# Patient Record
Sex: MALE | Race: WHITE | NOT HISPANIC OR LATINO | Employment: FULL TIME | URBAN - METROPOLITAN AREA
[De-identification: names, ages, dates, MRNs, and addresses within clinical notes are randomized per-mention and may not be internally consistent; named-entity substitution may affect disease eponyms.]

---

## 2020-09-18 LAB
ABS NEUT (OLG): 3.16 X10(3)/MCL (ref 2.1–9.2)
ALBUMIN SERPL-MCNC: 3.9 GM/DL (ref 3.5–5)
ALBUMIN/GLOB SERPL: 1.5 RATIO (ref 1.1–2)
ALP SERPL-CCNC: 52 UNIT/L (ref 40–150)
ALT SERPL-CCNC: 60 UNIT/L (ref 0–55)
APTT PPP: 28.5 SECOND(S) (ref 23.2–33.7)
AST SERPL-CCNC: 28 UNIT/L (ref 5–34)
BASOPHILS # BLD AUTO: 0 X10(3)/MCL (ref 0–0.2)
BASOPHILS NFR BLD AUTO: 1 %
BILIRUB SERPL-MCNC: 0.7 MG/DL
BILIRUBIN DIRECT+TOT PNL SERPL-MCNC: 0.2 MG/DL (ref 0–0.5)
BILIRUBIN DIRECT+TOT PNL SERPL-MCNC: 0.5 MG/DL (ref 0–0.8)
BUN SERPL-MCNC: 15.7 MG/DL (ref 8.9–20.6)
CALCIUM SERPL-MCNC: 9 MG/DL (ref 8.4–10.2)
CHLORIDE SERPL-SCNC: 100 MMOL/L (ref 98–107)
CO2 SERPL-SCNC: 28 MMOL/L (ref 22–29)
CREAT SERPL-MCNC: 1.27 MG/DL (ref 0.73–1.18)
EOSINOPHIL # BLD AUTO: 0.4 X10(3)/MCL (ref 0–0.9)
EOSINOPHIL NFR BLD AUTO: 8 %
ERYTHROCYTE [DISTWIDTH] IN BLOOD BY AUTOMATED COUNT: 14.1 % (ref 11.5–17)
GLOBULIN SER-MCNC: 2.6 GM/DL (ref 2.4–3.5)
GLUCOSE SERPL-MCNC: 89 MG/DL (ref 74–100)
HCT VFR BLD AUTO: 47.2 % (ref 42–52)
HGB BLD-MCNC: 15.1 GM/DL (ref 14–18)
INR PPP: 1 (ref 0–1.3)
LYMPHOCYTES # BLD AUTO: 1.8 X10(3)/MCL (ref 0.6–4.6)
LYMPHOCYTES NFR BLD AUTO: 31 %
MCH RBC QN AUTO: 28.6 PG (ref 27–31)
MCHC RBC AUTO-ENTMCNC: 32 GM/DL (ref 33–36)
MCV RBC AUTO: 89.4 FL (ref 80–94)
MONOCYTES # BLD AUTO: 0.4 X10(3)/MCL (ref 0.1–1.3)
MONOCYTES NFR BLD AUTO: 7 %
NEUTROPHILS # BLD AUTO: 3.16 X10(3)/MCL (ref 2.1–9.2)
NEUTROPHILS NFR BLD AUTO: 53 %
PLATELET # BLD AUTO: 275 X10(3)/MCL (ref 130–400)
PMV BLD AUTO: 9.6 FL (ref 9.4–12.4)
POTASSIUM SERPL-SCNC: 4.4 MMOL/L (ref 3.5–5.1)
PROT SERPL-MCNC: 6.5 GM/DL (ref 6.4–8.3)
PROTHROMBIN TIME: 12.6 SECOND(S) (ref 11.1–13.7)
RBC # BLD AUTO: 5.28 X10(6)/MCL (ref 4.7–6.1)
SODIUM SERPL-SCNC: 138 MMOL/L (ref 136–145)
WBC # SPEC AUTO: 5.9 X10(3)/MCL (ref 4.5–11.5)

## 2020-09-23 ENCOUNTER — HISTORICAL (OUTPATIENT)
Dept: SURGERY | Facility: HOSPITAL | Age: 50
End: 2020-09-23

## 2020-09-24 LAB
GRAM STN SPEC: NORMAL
GRAM STN SPEC: NORMAL

## 2020-09-25 LAB
FINAL CULTURE: NO GROWTH
FINAL CULTURE: NO GROWTH

## 2020-09-26 LAB
FINAL CULTURE: NORMAL
FINAL CULTURE: NORMAL

## 2020-10-26 ENCOUNTER — HISTORICAL (OUTPATIENT)
Dept: ADMINISTRATIVE | Facility: HOSPITAL | Age: 50
End: 2020-10-26

## 2020-10-26 LAB
FINAL CULTURE: NORMAL
FINAL CULTURE: NORMAL
PSA SERPL-MCNC: 1.66 NG/ML (ref 0–2.5)

## 2021-07-19 ENCOUNTER — HISTORICAL (OUTPATIENT)
Dept: ADMINISTRATIVE | Facility: HOSPITAL | Age: 51
End: 2021-07-19

## 2021-07-19 LAB
ALBUMIN SERPL-MCNC: 4.4 GM/DL (ref 3.4–5)
ALBUMIN/GLOB SERPL: 2.2 {RATIO} (ref 1.5–2.5)
ALP SERPL-CCNC: 53 UNIT/L (ref 38–126)
ALT SERPL-CCNC: 27 UNIT/L (ref 7–52)
ANTINUCLEAR ANTIBODY SCREEN (OHS): NEGATIVE
AST SERPL-CCNC: 21 UNIT/L (ref 15–37)
BILIRUB SERPL-MCNC: 0.5 MG/DL (ref 0.2–1)
BILIRUBIN DIRECT+TOT PNL SERPL-MCNC: 0.1 MG/DL (ref 0–0.5)
BILIRUBIN DIRECT+TOT PNL SERPL-MCNC: 0.4 MG/DL
BUN SERPL-MCNC: 21 MG/DL (ref 7–18)
CALCIUM SERPL-MCNC: 9.1 MG/DL (ref 8.5–10.1)
CHLORIDE SERPL-SCNC: 100 MMOL/L (ref 98–107)
CO2 SERPL-SCNC: 27 MMOL/L (ref 21–32)
CREAT SERPL-MCNC: 1.45 MG/DL (ref 0.6–1.3)
CRP SERPL-MCNC: 0.17 MG/DL
DSDNA ANTIBODY (OHS): NEGATIVE
ERYTHROCYTE [SEDIMENTATION RATE] IN BLOOD: 5 MM/HR (ref 0–15)
GLOBULIN SER-MCNC: 2 GM/DL (ref 1.2–3)
GLUCOSE SERPL-MCNC: 102 MG/DL (ref 74–106)
POTASSIUM SERPL-SCNC: 4.2 MMOL/L (ref 3.5–5.1)
PROT SERPL-MCNC: 6.4 GM/DL (ref 6.4–8.2)
RHEUMATOID FACT SERPL-ACNC: <13 IU/ML
SODIUM SERPL-SCNC: 136 MMOL/L (ref 136–145)
URATE SERPL-MCNC: 7.1 MG/DL (ref 2.6–7.2)

## 2021-08-16 ENCOUNTER — HISTORICAL (OUTPATIENT)
Dept: ADMINISTRATIVE | Facility: HOSPITAL | Age: 51
End: 2021-08-16

## 2021-08-16 LAB
ALBUMIN SERPL-MCNC: 4.1 GM/DL (ref 3.5–5)
ALBUMIN/GLOB SERPL: 1.4 RATIO (ref 1.1–2)
ALP SERPL-CCNC: 62 UNIT/L (ref 40–150)
ALT SERPL-CCNC: 28 UNIT/L (ref 0–55)
APPEARANCE, UA: CLEAR
AST SERPL-CCNC: 23 UNIT/L (ref 5–34)
BACTERIA SPEC CULT: NORMAL /HPF
BILIRUB SERPL-MCNC: 0.6 MG/DL
BILIRUB UR QL STRIP: NEGATIVE
BILIRUBIN DIRECT+TOT PNL SERPL-MCNC: 0.2 MG/DL (ref 0–0.5)
BILIRUBIN DIRECT+TOT PNL SERPL-MCNC: 0.4 MG/DL (ref 0–0.8)
BUN SERPL-MCNC: 11.5 MG/DL (ref 8.9–20.6)
CALCIUM SERPL-MCNC: 9.5 MG/DL (ref 8.4–10.2)
CHLORIDE SERPL-SCNC: 104 MMOL/L (ref 98–107)
CO2 SERPL-SCNC: 20 MMOL/L (ref 22–29)
COLOR UR: YELLOW
CREAT SERPL-MCNC: 1.28 MG/DL (ref 0.73–1.18)
CRP SERPL HS-MCNC: 0.3 MG/DL
GLOBULIN SER-MCNC: 2.9 GM/DL (ref 2.4–3.5)
GLUCOSE (UA): NEGATIVE
GLUCOSE SERPL-MCNC: 79 MG/DL (ref 74–100)
HGB UR QL STRIP: NEGATIVE
KETONES UR QL STRIP: NEGATIVE
LEUKOCYTE ESTERASE UR QL STRIP: NEGATIVE
NITRITE UR QL STRIP: NEGATIVE
PH UR STRIP: 6 [PH] (ref 5–9)
POTASSIUM SERPL-SCNC: 4.9 MMOL/L (ref 3.5–5.1)
PROT SERPL-MCNC: 7 GM/DL (ref 6.4–8.3)
PROT UR QL STRIP: NEGATIVE
RBC #/AREA URNS HPF: NORMAL /[HPF]
SODIUM SERPL-SCNC: 136 MMOL/L (ref 136–145)
SP GR UR STRIP: 1.01 (ref 1–1.03)
SQUAMOUS EPITHELIAL, UA: NORMAL /HPF (ref 0–4)
UROBILINOGEN UR STRIP-ACNC: 0.2
WBC #/AREA URNS HPF: NORMAL /HPF

## 2021-08-17 ENCOUNTER — HISTORICAL (OUTPATIENT)
Dept: ADMINISTRATIVE | Facility: HOSPITAL | Age: 51
End: 2021-08-17

## 2021-08-17 LAB
ABS NEUT (OLG): 2.3 X10(3)/MCL (ref 2.1–9.2)
BASOPHILS # BLD AUTO: 0 X10(3)/MCL (ref 0–0.2)
BASOPHILS NFR BLD AUTO: 1 %
EOSINOPHIL # BLD AUTO: 0.3 X10(3)/MCL (ref 0–0.9)
EOSINOPHIL NFR BLD AUTO: 6 %
ERYTHROCYTE [DISTWIDTH] IN BLOOD BY AUTOMATED COUNT: 13.7 % (ref 11.5–17)
HCT VFR BLD AUTO: 44.2 % (ref 42–52)
HGB BLD-MCNC: 14.3 GM/DL (ref 14–18)
LYMPHOCYTES # BLD AUTO: 1.7 X10(3)/MCL (ref 0.6–4.6)
LYMPHOCYTES NFR BLD AUTO: 36 %
MCH RBC QN AUTO: 28.5 PG (ref 27–31)
MCHC RBC AUTO-ENTMCNC: 32.4 GM/DL (ref 33–36)
MCV RBC AUTO: 88.2 FL (ref 80–94)
MONOCYTES # BLD AUTO: 0.3 X10(3)/MCL (ref 0.1–1.3)
MONOCYTES NFR BLD AUTO: 7 %
NEUTROPHILS # BLD AUTO: 2.3 X10(3)/MCL (ref 2.1–9.2)
NEUTROPHILS NFR BLD AUTO: 50 %
PLATELET # BLD AUTO: 252 X10(3)/MCL (ref 130–400)
PMV BLD AUTO: 10 FL (ref 9.4–12.4)
RBC # BLD AUTO: 5.01 X10(6)/MCL (ref 4.7–6.1)
WBC # SPEC AUTO: 4.6 X10(3)/MCL (ref 4.5–11.5)

## 2022-04-11 ENCOUNTER — HISTORICAL (OUTPATIENT)
Dept: ADMINISTRATIVE | Facility: HOSPITAL | Age: 52
End: 2022-04-11
Payer: COMMERCIAL

## 2022-04-29 VITALS
OXYGEN SATURATION: 97 % | DIASTOLIC BLOOD PRESSURE: 84 MMHG | SYSTOLIC BLOOD PRESSURE: 122 MMHG | BODY MASS INDEX: 33.99 KG/M2 | HEIGHT: 69 IN | WEIGHT: 229.5 LBS

## 2022-04-30 NOTE — OP NOTE
DATE OF SURGERY:        SURGEON:  Allen Chaudhary MD  ASSISTANT:  Palmira Osman RN    PREOPERATIVE DIAGNOSIS:  Nasal obstruction, chronic sinusitis.    POSTOPERATIVE DIAGNOSIS:  Nasal obstruction, chronic sinusitis.    PROCEDURES:  Septoplasty, bilateral complete ethmoidectomies, exploration of the nasofrontal ducts and sphenoidotomies, inferior turbinate reduction.    DESCRIPTION OF PROCEDURE:  The patient was brought to the operating room, placed in the supine position.  After achieving general endotracheal anesthesia, eyes were protected with Lacri-Lube while throat pack was placed in the oropharynx.  Then, 4% Betadine paint was used to irrigate the nose, and after sufficient time had elapsed, the nose was decongested with 4% cocaine, topical adrenaline, as well as injected with 1% lidocaine with 1:100,000 epinephrine into the soft tissues of the nose.  Navigation was instituted, and the patient prepped and draped for surgery.  We used a 15 blade.  A transfixion incision was made with the use of Iris scissors, 15 blade, and [place] elevator.  Bilateral mucoperichondrial flaps were elevated.  Deviated portions of quadrilateral cartilage were removed as well as bone using a floor chisel.  Care was taken to leave appropriate dorsal and caudal struts.  Raymundo-Diego forceps were used then to remove the posterior deviation.  Once the deviation was corrected, 4-0 plain sutures on a Dwight needle were used to re-coapt the mucoperichondrial flaps.  4-0 chromic sutures were used to close the transfixion incision.  Then, with the 0-degree endoscope, the middle turbinate on the left was infractured, exposing the bulla ethmoidalis, which was removed with a ball-tip seeker as well as backbiting forceps and shaver.  The bulla ethmoidalis was entered as well as the frontal recess, removing large amounts of polyps from both regions.  Maxillary sphenoidotomy was completed and noted the sphenoid sinus to be clear.   The maxillary sinus was then identified and ostia enlarged with posterior Hang-Cut forceps and anterior backbiting forceps.  Cultures were taken.  Thick mucoid discharge was suctioned from the maxillary sinus.  A Propel nasal stent was placed in the middle turbinate and lateral nasal wall.  The inferior nasal turbinate was then infractured with Gregg elevator, hydrodissected with 1% lidocaine with 1:100,000 epinephrine.  Then the sickle knife was used to make a stab incision on the anterior edge.  [place] elevator was used to tease the mucosa off the turbinate bone, and then a shaver was used to perform submucous resection.  The turbinates were then crushed and outfractured.  Procedure was done bilaterally.  A Rapid Rhino was placed along the inferior nasal turbinate.  A small amount of Bactroban ointment was used to pack the nose anteriorly.  Throat packs removed.  Pharynx was carefully suctioned.  The eyes were washed with BSS.  Cold     compress applied.  The patient tolerated the procedure well, was awakened and extubated in the operating room, brought to recovery in stable condition.        ______________________________  MD CARLEEN Mendenhall/  DD:  09/30/2020  Time:  10:15AM  DT:  09/30/2020  Time:  10:34AM  Job #:  [id number]

## 2022-05-05 NOTE — HISTORICAL [facility] CERNER
This is a historical note converted from Kelly. Formatting and pictures may have been removed.  Please reference Kelly for original formatting and attached multimedia. Chief Complaint  Pansinusitis  Reason for Consultation  Hypertension  History of Present Illness  Pt is a 49 year old WM pt of Dr. Hansel Sarabia (pt hasnt seen him in a while)?who was admitted to Dr. Kenan Chaudhary with ENT here at [facility] for an elective FESS for pansinusitis and DNS.? No h/o HTN.? Pts BP was 128/83 preoperatively on arrival but postoperatively he has been running 170s-190s over 100s to 120s.? Received some labetalol, clonidine, metoprolol without much effect.? Pt resting comfortably.? He is anxious but denies dizziness, CP, or SOB.  Review of Systems  Except as documented all systems were reviewed and are negative.  ?   Medications (15) Active  Scheduled: (2)  cloniDINE 0.3 mg Tab UD ?0.3 mg 1 tab(s), Oral, Once  labetalol 200 mg Tab UD ?200 mg 1 tab(s), Oral, BID  Continuous: (1)  lactated ringers 1,000 mL ?1,000 mL, IV, 75 mL/hr  PRN: (12)  enalapril 1.25 mg/mL Soln - 2mL ?2.5 mg 2 mL, IV Push, q2hr  hydrALAzine 20 mg/ml Inj- 1 mL ?20 mg 1 mL, IV Push, q2hr  hydrocodone 7.5mg/APAP 325mg ?1 tab(s), Oral, q4hr  labetalol 5 mg/mL 20mL vial ?20 mg 4 mL, IV Push, q2hr  LORazepam 2 mg/ml Inj ?2 mg 1 mL, IV Push, q4hr  meperidine 25 mg/ml Inj - 1 mL ?75 mg 3 mL, IM, q4hr  meperidine 50 mg/ml Inj ?50 mg 1 mL, IM, q4hr  meperidine 50 mg/ml Inj ?50 mg 1 mL, IM, q4hr  morphine 4 mg/mL preservative-free Soln ?2 mg 0.5 mL, IV Push, q10min  ondansetron 2 mg/mL inj - 2mL ?4 mg 2 mL, IV, q4hr  ondansetron 2 mg/mL inj - 2mL ?4 mg 2 mL, IM, q4hr  oxymetazoline nasal 0.05% Spray - 30mL ?2 spray(s), Both Nostrils, q4hr  Physical Exam  Vitals & Measurements  T:?36.7? ?C (Temporal Artery)? TMIN:?36.6? ?C (Temporal Artery)? TMAX:?36.9? ?C (Oral)? HR:?91(Peripheral)? RR:?20? BP:?191/122? SpO2:?97%? WT:?107.7?kg? BMI:?35.06?  General:?obese WM in no  acute distress  Eye: PERRLA, EOMI, clear conjunctiva, eyelids normal  HEENT:?facial and neck erythema; nasal packing in place  Neck: full range of motion, no thyromegaly or lymphadenopathy  Respiratory:?clear to auscultation bilaterally  Cardiovascular:?regular rate and rhythm  Gastrointestinal:?soft, non-tender, non-distended with normal bowel sounds  Musculoskeletal:?no gross deformity  Integumentary: no rashes or skin lesions present  Neurologic: cranial nerves grossly intact, no signs of peripheral neurological deficit  Psychiatric: flat affect; anxious  Assessment/Plan  1.?Hypertensive urgency?I16.0  2.?Chronic pansinusitis?J32.4  3.?Obesity?E66.9  4.?Anxiety?F41.9  5.?Depression?F32.9  6.?GERD - Gastro-esophageal reflux disease?K21.9  7.?Sleep apnea?G47.30  8.?Elevated serum creatinine?R79.89  ?makes me suspect underlying undiagnosed hypertension  ?  Plan:  Start with clonidine 0.3 mg and labetalol 200 mg PO now  Alternate IV labetalol, hydralazine, enalapril PRN BP > 160/90  If unable to control BP with above then will have to transfer to main [place] for a continuous antihypertensive infusion   Problem List/Past Medical History  Ongoing  Anxiety  Chronic pansinusitis  Depression  GERD - Gastro-esophageal reflux disease  Obesity  Sleep apnea  Smoker  Historical  Obesity  Procedure/Surgical History  FESS Septo/Turb SMR with Mario (Bilateral) (2020)  Colonoscopy, flexible; with endoscopic ultrasound examination limited to the rectum, sigmoid, descending, transverse, or ascending colon and cecum, and adjacent structures ()  Repair, paraesophageal hiatal hernia, (including fundoplication), via thoracoabdominal incision, except ; without implantation of mesh or other prosthesis ()  Tonsillectomy, primary or secondary; age 12 or over ()  Hyoid myotomy and suspension   Medications  Inpatient  Afrin Nasal Sinus, 2 spray(s), Both Nostrils, q4hr, PRN  Ativan, 2 mg= 1 mL, IV Push, q4hr,  PRN  Demerol HCl, 50 mg= 1 mL, IM, q4hr, PRN  Demerol HCl, 75 mg= 3 mL, IM, q4hr, PRN  Demerol HCl 50 mg/mL injectable solution, 50 mg= 1 mL, IM, q4hr, PRN  enalapril 1.25 mg/mL intravenous solution, 2.5 mg= 2 mL, IV Push, q2hr, PRN  hydrALAZINE (Apresoline) Inj., 20 mg= 1 mL, IV Push, q2hr, PRN  labetalol 200 mg oral tablet, 200 mg= 1 tab(s), Oral, BID  labetalol 5 mg/mL intravenous solution, 20 mg= 4 mL, IV Push, q2hr, PRN  [id number] 1,000 mL, 1000 mL, IV  morphine, 2 mg= 0.5 mL, IV Push, q10min, PRN  Norco 7.5 mg-325 mg oral tablet, 1 tab(s), Oral, q4hr, PRN  Zofran, 4 mg= 2 mL, IV, q4hr, PRN  Zofran, 4 mg= 2 mL, IM, q4hr, PRN  Home  acetaminophen-hydrocodone 325 mg-7.5 mg oral tablet, 1 tab(s), Oral, q6hr  amphetamine-dextroamphetamine 20 mg oral tablet, 20 mg= 1 tab(s), Oral, BID,? ?Not taking  aspirin 81 mg oral Delayed Release (EC) tablet, 81 mg= 1 tab(s), Oral, Daily  cefdinir 300 mg oral capsule, 300 mg= 1 cap(s), Oral, q12hr  clonazePAM 1 mg oral tablet, 1 mg= 1 tab(s), Oral, TID,? ?Not taking  famotidine 20 mg oral tablet, 20 mg= 1 tab(s), Oral, qPM  fluticasone 50 mcg/inh nasal spray, 1 spray(s), Both Nostrils, qPM  methylPREDNISolone 4 mg oral tab, Oral, As Directed  omeprazole 40 mg oral DR capsule, 40 mg= 1 cap(s), Oral, BID  Zanaflex 4 mg oral capsule, 4 mg= 1 cap(s), Oral, TID,? ?Not taking  Allergies  No Known Allergies  No Known Medication Allergies  Social History  Abuse/Neglect  No, No, Yes, 09/23/2020  No, 11/19/2019  Alcohol  Current, Beer, Wine, 1-2 times per month, 09/23/2020  Employment/School  Unemployed, LAYED OFF, Work/School description:  LAYED OFF IN July. Highest education level: High school., 09/23/2020  Exercise  Exercise type: Walking., 09/20/2020  Financial/Legal Situation  LAYED OFF OF WORK IN MARCH, 09/20/2020  Home/Environment  Lives with Significant other. Living situation: Home/Independent. CPAP/BiPAP, 09/20/2020    Never in ,  09/20/2020  Nutrition/Health  Regular, Good, Diet restrictions: NO ALLERGIES TO FOOD., 09/20/2020  Sexual  Sexually active: Yes. Number of current partners 1. Gender Identity Identifies as male., 09/20/2020  Spiritual/Cultural  Voodoo, 09/20/2020  Substance Use  Past, Amphetamines, Cocaine, Ecstasy, Marijuana, 09/20/2020  Tobacco  10 or more cigarettes (1/2 pack or more)/day in last 30 days, No, 09/23/2020  Former smoker, Cigarettes, 07/20/2017  Family History  Primary malignant neoplasm of prostate: Father.  Lab Results  SCr of 1.27 on 9/18/20 and 1.36 on 11/19/19      Critical Care Diagnosis: Hypertensive urgency requiring IV antihypertensives  Critical care interventions: hands on evaluation, review of labs/radiographs/records and discussions with patient.?  Critical care time spent =?35 minutes.

## 2022-08-12 ENCOUNTER — OFFICE VISIT (OUTPATIENT)
Dept: RHEUMATOLOGY | Facility: CLINIC | Age: 52
End: 2022-08-12
Payer: COMMERCIAL

## 2022-08-12 VITALS
DIASTOLIC BLOOD PRESSURE: 86 MMHG | SYSTOLIC BLOOD PRESSURE: 122 MMHG | TEMPERATURE: 98 F | WEIGHT: 234.38 LBS | HEART RATE: 100 BPM | OXYGEN SATURATION: 95 % | HEIGHT: 69 IN | RESPIRATION RATE: 18 BRPM | BODY MASS INDEX: 34.71 KG/M2

## 2022-08-12 DIAGNOSIS — L40.50 PSORIATIC ARTHRITIS: Primary | ICD-10-CM

## 2022-08-12 DIAGNOSIS — N28.9 RENAL INSUFFICIENCY: ICD-10-CM

## 2022-08-12 DIAGNOSIS — L40.9 PSORIASIS: ICD-10-CM

## 2022-08-12 DIAGNOSIS — F90.0 ATTENTION DEFICIT HYPERACTIVITY DISORDER (ADHD), PREDOMINANTLY INATTENTIVE TYPE: ICD-10-CM

## 2022-08-12 DIAGNOSIS — G47.30 SLEEP APNEA, UNSPECIFIED TYPE: ICD-10-CM

## 2022-08-12 PROCEDURE — 3008F BODY MASS INDEX DOCD: CPT | Mod: CPTII,S$GLB,, | Performed by: INTERNAL MEDICINE

## 2022-08-12 PROCEDURE — 1160F PR REVIEW ALL MEDS BY PRESCRIBER/CLIN PHARMACIST DOCUMENTED: ICD-10-PCS | Mod: CPTII,S$GLB,, | Performed by: INTERNAL MEDICINE

## 2022-08-12 PROCEDURE — 99213 OFFICE O/P EST LOW 20 MIN: CPT | Mod: S$GLB,,, | Performed by: INTERNAL MEDICINE

## 2022-08-12 PROCEDURE — 3008F PR BODY MASS INDEX (BMI) DOCUMENTED: ICD-10-PCS | Mod: CPTII,S$GLB,, | Performed by: INTERNAL MEDICINE

## 2022-08-12 PROCEDURE — 99999 PR PBB SHADOW E&M-EST. PATIENT-LVL III: ICD-10-PCS | Mod: PBBFAC,,, | Performed by: INTERNAL MEDICINE

## 2022-08-12 PROCEDURE — 99999 PR PBB SHADOW E&M-EST. PATIENT-LVL III: CPT | Mod: PBBFAC,,, | Performed by: INTERNAL MEDICINE

## 2022-08-12 PROCEDURE — 1159F PR MEDICATION LIST DOCUMENTED IN MEDICAL RECORD: ICD-10-PCS | Mod: CPTII,S$GLB,, | Performed by: INTERNAL MEDICINE

## 2022-08-12 PROCEDURE — 1159F MED LIST DOCD IN RCRD: CPT | Mod: CPTII,S$GLB,, | Performed by: INTERNAL MEDICINE

## 2022-08-12 PROCEDURE — 1160F RVW MEDS BY RX/DR IN RCRD: CPT | Mod: CPTII,S$GLB,, | Performed by: INTERNAL MEDICINE

## 2022-08-12 PROCEDURE — 99213 PR OFFICE/OUTPT VISIT, EST, LEVL III, 20-29 MIN: ICD-10-PCS | Mod: S$GLB,,, | Performed by: INTERNAL MEDICINE

## 2022-08-12 RX ORDER — ROSUVASTATIN CALCIUM 10 MG/1
10 TABLET, COATED ORAL NIGHTLY
COMMUNITY
Start: 2022-08-11

## 2022-08-12 RX ORDER — AMOXICILLIN AND CLAVULANATE POTASSIUM 875; 125 MG/1; MG/1
1 TABLET, FILM COATED ORAL 2 TIMES DAILY
COMMUNITY
Start: 2022-08-05 | End: 2023-01-23

## 2022-08-12 RX ORDER — CYCLOBENZAPRINE HCL 10 MG
10 TABLET ORAL NIGHTLY
Qty: 90 TABLET | Refills: 3 | Status: SHIPPED | OUTPATIENT
Start: 2022-08-12 | End: 2022-12-09 | Stop reason: SDUPTHER

## 2022-08-12 RX ORDER — ASPIRIN 81 MG/1
81 TABLET ORAL DAILY
COMMUNITY

## 2022-08-12 RX ORDER — LEFLUNOMIDE 20 MG/1
20 TABLET ORAL DAILY
Qty: 90 TABLET | Refills: 3 | Status: SHIPPED | OUTPATIENT
Start: 2022-08-12 | End: 2022-12-09 | Stop reason: SDUPTHER

## 2022-08-12 RX ORDER — LEFLUNOMIDE 20 MG/1
20 TABLET ORAL DAILY
COMMUNITY
Start: 2022-08-11 | End: 2022-08-12 | Stop reason: SDUPTHER

## 2022-08-12 RX ORDER — OMEPRAZOLE 40 MG/1
40 CAPSULE, DELAYED RELEASE ORAL DAILY
Qty: 90 CAPSULE | Refills: 3 | Status: SHIPPED | OUTPATIENT
Start: 2022-08-12 | End: 2022-12-09 | Stop reason: SDUPTHER

## 2022-08-12 RX ORDER — CYCLOBENZAPRINE HCL 10 MG
10 TABLET ORAL NIGHTLY
COMMUNITY
Start: 2022-08-11 | End: 2022-08-12 | Stop reason: SDUPTHER

## 2022-08-12 RX ORDER — FLUTICASONE PROPIONATE 50 MCG
1 SPRAY, SUSPENSION (ML) NASAL DAILY
COMMUNITY
Start: 2022-08-11

## 2022-08-12 RX ORDER — OMEPRAZOLE 40 MG/1
40 CAPSULE, DELAYED RELEASE ORAL DAILY
COMMUNITY
Start: 2022-02-11 | End: 2022-08-12 | Stop reason: SDUPTHER

## 2022-08-12 NOTE — PROGRESS NOTES
"Subjective:       Patient ID: Scott Plaza is a 51 y.o. male.    Chief Complaint: 4 month f/u    The patient is complaining of joint pain involving the MCP PIP wrist elbow shoulders hips knees and ankles bilaterally.  The pain is 2/10 in intensity dull in quality and continuous.  That is associated with a morning stiffness lasting for more than 60 minutes.  Is also having difficulty maintaining a good night of sleep.  This has been associated with myalgias.  Muscle aches are 2/10 in intensity dull in quality and continuous.  They are associated with fatigue.  No fever no chills no others.      Review of Systems   Constitutional: Negative for appetite change, chills and fever.   HENT: Negative for congestion, ear pain, mouth sores, nosebleeds and trouble swallowing.    Eyes: Negative for photophobia and discharge.   Respiratory: Negative for chest tightness and shortness of breath.    Cardiovascular: Negative for chest pain.   Gastrointestinal: Negative for abdominal pain and vomiting.   Endocrine: Negative.    Genitourinary: Negative for hematuria.   Musculoskeletal:        As per HPI   Skin: Negative for rash.   Neurological: Negative for weakness.         Objective:   /86 (BP Location: Left arm, Patient Position: Sitting, BP Method: Medium (Automatic))   Pulse 100   Temp 98.1 °F (36.7 °C) (Oral)   Resp 18   Ht 5' 9" (1.753 m)   Wt 106.3 kg (234 lb 5.8 oz)   SpO2 95%   BMI 34.61 kg/m²      Physical Exam   Constitutional: He is oriented to person, place, and time. He appears well-developed and well-nourished. No distress.   HENT:   Head: Normocephalic and atraumatic.   Right Ear: External ear normal.   Left Ear: External ear normal.   Eyes: Pupils are equal, round, and reactive to light.   Cardiovascular: Normal rate, regular rhythm and normal heart sounds.   Pulmonary/Chest: Breath sounds normal.   Abdominal: Soft. There is no abdominal tenderness.   Musculoskeletal:      Right shoulder: Normal. "      Left shoulder: Normal.      Right elbow: Normal.      Left elbow: Normal.      Right wrist: Normal.      Left wrist: Normal.      Cervical back: Neck supple.      Right hip: Normal.      Left hip: Normal.      Right knee: Normal.      Left knee: Normal.   Lymphadenopathy:     He has no cervical adenopathy.   Neurological: He is alert and oriented to person, place, and time. He displays normal reflexes. No cranial nerve deficit or sensory deficit. He exhibits normal muscle tone. Coordination normal.   Skin: No rash noted. No erythema.   Vitals reviewed.      Right Side Rheumatological Exam     Examination finds the shoulder, elbow, wrist, knee, 1st PIP, 1st MCP, 2nd PIP, 2nd MCP, 3rd PIP, 3rd MCP, 4th PIP, 4th MCP, 5th PIP, 5th MCP, temporomandibular, hip, ankle, 1st MTP, 2nd MTP, 3rd MTP, 4th MTP and 5th MTP normal.    Left Side Rheumatological Exam     Examination finds the shoulder, elbow, wrist, knee, 1st PIP, 1st MCP, 2nd PIP, 2nd MCP, 3rd PIP, 3rd MCP, 4th PIP, 4th MCP, 5th PIP, 5th MCP, temporomandibular, hip, ankle, 1st MTP, 2nd MTP, 3rd MTP, 4th MTP and 5th MTP normal.         Completed Fibromyalgia exam 2/18 tender points.  No data to display     Assessment:       1. Psoriatic arthritis    2. Psoriasis    3. Renal insufficiency    4. Sleep apnea, unspecified type    5. Attention deficit hyperactivity disorder (ADHD), predominantly inattentive type            Plan:       Problem List Items Addressed This Visit        Psychiatric    Attention deficit hyperactivity disorder (ADHD), predominantly inattentive type    Relevant Medications    cyclobenzaprine (FLEXERIL) 10 MG tablet    leflunomide (ARAVA) 20 MG Tab    omeprazole (PRILOSEC) 40 MG capsule       Derm    Psoriasis    Relevant Medications    cyclobenzaprine (FLEXERIL) 10 MG tablet    leflunomide (ARAVA) 20 MG Tab    omeprazole (PRILOSEC) 40 MG capsule       Renal/    Renal insufficiency    Relevant Medications    cyclobenzaprine (FLEXERIL) 10 MG  tablet    leflunomide (ARAVA) 20 MG Tab    omeprazole (PRILOSEC) 40 MG capsule       Orthopedic    Psoriatic arthritis - Primary    Relevant Medications    cyclobenzaprine (FLEXERIL) 10 MG tablet    leflunomide (ARAVA) 20 MG Tab    omeprazole (PRILOSEC) 40 MG capsule       Other    Sleep apnea    Relevant Medications    cyclobenzaprine (FLEXERIL) 10 MG tablet    leflunomide (ARAVA) 20 MG Tab    omeprazole (PRILOSEC) 40 MG capsule

## 2022-12-09 ENCOUNTER — OFFICE VISIT (OUTPATIENT)
Dept: RHEUMATOLOGY | Facility: CLINIC | Age: 52
End: 2022-12-09
Payer: COMMERCIAL

## 2022-12-09 VITALS
BODY MASS INDEX: 34.93 KG/M2 | RESPIRATION RATE: 18 BRPM | WEIGHT: 235.81 LBS | SYSTOLIC BLOOD PRESSURE: 133 MMHG | HEART RATE: 96 BPM | TEMPERATURE: 98 F | HEIGHT: 69 IN | OXYGEN SATURATION: 95 % | DIASTOLIC BLOOD PRESSURE: 83 MMHG

## 2022-12-09 DIAGNOSIS — F90.0 ATTENTION DEFICIT HYPERACTIVITY DISORDER (ADHD), PREDOMINANTLY INATTENTIVE TYPE: ICD-10-CM

## 2022-12-09 DIAGNOSIS — G47.30 SLEEP APNEA, UNSPECIFIED TYPE: ICD-10-CM

## 2022-12-09 DIAGNOSIS — N28.9 RENAL INSUFFICIENCY: ICD-10-CM

## 2022-12-09 DIAGNOSIS — L40.9 PSORIASIS: ICD-10-CM

## 2022-12-09 DIAGNOSIS — L40.50 PSORIATIC ARTHRITIS: Primary | ICD-10-CM

## 2022-12-09 PROCEDURE — 1159F PR MEDICATION LIST DOCUMENTED IN MEDICAL RECORD: ICD-10-PCS | Mod: CPTII,S$GLB,, | Performed by: INTERNAL MEDICINE

## 2022-12-09 PROCEDURE — 3079F DIAST BP 80-89 MM HG: CPT | Mod: CPTII,S$GLB,, | Performed by: INTERNAL MEDICINE

## 2022-12-09 PROCEDURE — 3008F PR BODY MASS INDEX (BMI) DOCUMENTED: ICD-10-PCS | Mod: CPTII,S$GLB,, | Performed by: INTERNAL MEDICINE

## 2022-12-09 PROCEDURE — 3075F PR MOST RECENT SYSTOLIC BLOOD PRESS GE 130-139MM HG: ICD-10-PCS | Mod: CPTII,S$GLB,, | Performed by: INTERNAL MEDICINE

## 2022-12-09 PROCEDURE — 3079F PR MOST RECENT DIASTOLIC BLOOD PRESSURE 80-89 MM HG: ICD-10-PCS | Mod: CPTII,S$GLB,, | Performed by: INTERNAL MEDICINE

## 2022-12-09 PROCEDURE — 99999 PR PBB SHADOW E&M-EST. PATIENT-LVL III: ICD-10-PCS | Mod: PBBFAC,,, | Performed by: INTERNAL MEDICINE

## 2022-12-09 PROCEDURE — 1159F MED LIST DOCD IN RCRD: CPT | Mod: CPTII,S$GLB,, | Performed by: INTERNAL MEDICINE

## 2022-12-09 PROCEDURE — 3008F BODY MASS INDEX DOCD: CPT | Mod: CPTII,S$GLB,, | Performed by: INTERNAL MEDICINE

## 2022-12-09 PROCEDURE — 99999 PR PBB SHADOW E&M-EST. PATIENT-LVL III: CPT | Mod: PBBFAC,,, | Performed by: INTERNAL MEDICINE

## 2022-12-09 PROCEDURE — 3075F SYST BP GE 130 - 139MM HG: CPT | Mod: CPTII,S$GLB,, | Performed by: INTERNAL MEDICINE

## 2022-12-09 PROCEDURE — 99214 PR OFFICE/OUTPT VISIT, EST, LEVL IV, 30-39 MIN: ICD-10-PCS | Mod: S$GLB,,, | Performed by: INTERNAL MEDICINE

## 2022-12-09 PROCEDURE — 99214 OFFICE O/P EST MOD 30 MIN: CPT | Mod: S$GLB,,, | Performed by: INTERNAL MEDICINE

## 2022-12-09 RX ORDER — LEFLUNOMIDE 20 MG/1
20 TABLET ORAL DAILY
Qty: 30 TABLET | Refills: 5 | Status: SHIPPED | OUTPATIENT
Start: 2022-12-09 | End: 2023-04-14 | Stop reason: SDUPTHER

## 2022-12-09 RX ORDER — OMEPRAZOLE 40 MG/1
40 CAPSULE, DELAYED RELEASE ORAL EVERY MORNING
Qty: 30 CAPSULE | Refills: 5 | Status: SHIPPED | OUTPATIENT
Start: 2022-12-09 | End: 2023-05-11 | Stop reason: SDUPTHER

## 2022-12-09 RX ORDER — CLOBETASOL PROPIONATE 0.5 MG/G
CREAM TOPICAL NIGHTLY
Qty: 60 G | Refills: 5 | Status: SHIPPED | OUTPATIENT
Start: 2022-12-09 | End: 2023-09-27 | Stop reason: SDUPTHER

## 2022-12-09 RX ORDER — CYCLOBENZAPRINE HCL 10 MG
20 TABLET ORAL NIGHTLY
Qty: 60 TABLET | Refills: 5 | Status: SHIPPED | OUTPATIENT
Start: 2022-12-09 | End: 2023-01-23

## 2022-12-09 NOTE — PROGRESS NOTES
"Subjective:       Patient ID: Scott Plaza is a 52 y.o. male.    Chief Complaint: Follow-up    The patient is complaining of joint pain involving the MCP PIP wrist elbow shoulders hips knees and ankles bilaterally.  The pain is 2/10 in intensity dull in quality and continuous.  That is associated with a morning stiffness lasting for more than 60 minutes.  Is also having difficulty maintaining a good night of sleep.  This has been associated with myalgias.  Muscle aches are 4/10 in intensity dull in quality and continuous.  They are associated with fatigue.  No fever no chills no others.  Psoriasis 20% bsa    Review of Systems   Constitutional:  Negative for appetite change, chills and fever.   HENT:  Negative for congestion, ear pain, mouth sores, nosebleeds and trouble swallowing.    Eyes:  Negative for photophobia and discharge.   Respiratory:  Negative for chest tightness and shortness of breath.    Cardiovascular:  Negative for chest pain.   Gastrointestinal:  Negative for abdominal pain and vomiting.   Endocrine: Negative.    Genitourinary:  Negative for hematuria.   Musculoskeletal:         As per HPI   Skin:         Psoriasis 20% bsa   Neurological:  Negative for weakness.       Objective:   /83 (BP Location: Left arm, Patient Position: Sitting, BP Method: Large (Automatic))   Pulse 96   Temp 98 °F (36.7 °C) (Oral)   Resp 18   Ht 5' 9" (1.753 m)   Wt 107 kg (235 lb 12.8 oz)   SpO2 95%   BMI 34.82 kg/m²      Physical Exam   Constitutional: He is oriented to person, place, and time. He appears well-developed and well-nourished. No distress.   HENT:   Head: Normocephalic and atraumatic.   Right Ear: External ear normal.   Left Ear: External ear normal.   Eyes: Pupils are equal, round, and reactive to light.   Cardiovascular: Normal rate, regular rhythm and normal heart sounds.   Pulmonary/Chest: Breath sounds normal.   Abdominal: Soft. There is no abdominal tenderness.   Musculoskeletal:      " Right shoulder: Normal.      Left shoulder: Normal.      Right elbow: Normal.      Left elbow: Normal.      Right wrist: Normal.      Left wrist: Normal.      Cervical back: Neck supple.      Right hip: Normal.      Left hip: Normal.      Right knee: Normal.      Left knee: Normal.   Lymphadenopathy:     He has no cervical adenopathy.   Neurological: He is alert and oriented to person, place, and time. He displays normal reflexes. No cranial nerve deficit or sensory deficit. He exhibits normal muscle tone. Coordination normal.   Skin:   Psoriasis 20% bsa   Vitals reviewed.      Right Side Rheumatological Exam     Examination finds the shoulder, elbow, wrist, knee, 1st PIP, 1st MCP, 2nd PIP, 2nd MCP, 3rd PIP, 3rd MCP, 4th PIP, 4th MCP, 5th PIP, 5th MCP, temporomandibular, hip, ankle, 1st MTP, 2nd MTP, 3rd MTP, 4th MTP and 5th MTP normal.    Left Side Rheumatological Exam     Examination finds the shoulder, elbow, wrist, knee, 1st PIP, 1st MCP, 2nd PIP, 2nd MCP, 3rd PIP, 3rd MCP, 4th PIP, 4th MCP, 5th PIP, 5th MCP, temporomandibular, hip, ankle, 1st MTP, 2nd MTP, 3rd MTP, 4th MTP and 5th MTP normal.       Completed Fibromyalgia exam 18/18 tender points.  No data to display     Assessment:       1. Psoriatic arthritis    2. Renal insufficiency    3. Psoriasis    4. Attention deficit hyperactivity disorder (ADHD), predominantly inattentive type    5. Sleep apnea, unspecified type            Medication List with Changes/Refills   New Medications    CLOBETASOL (TEMOVATE) 0.05 % CREAM    Apply topically every evening.       Start Date: 12/9/2022 End Date: --   Current Medications    AMOXICILLIN-CLAVULANATE 875-125MG (AUGMENTIN) 875-125 MG PER TABLET    Take 1 tablet by mouth 2 (two) times daily.       Start Date: 8/5/2022  End Date: --    ASPIRIN (ECOTRIN) 81 MG EC TABLET    Take 81 mg by mouth once daily.       Start Date: --        End Date: --    FLUTICASONE PROPIONATE (FLONASE) 50 MCG/ACTUATION NASAL SPRAY    1 spray  by Each Nostril route once daily at 6am.       Start Date: 8/11/2022 End Date: --    ROSUVASTATIN (CRESTOR) 10 MG TABLET    Take 10 mg by mouth nightly.       Start Date: 8/11/2022 End Date: --   Changed and/or Refilled Medications    Modified Medication Previous Medication    CYCLOBENZAPRINE (FLEXERIL) 10 MG TABLET cyclobenzaprine (FLEXERIL) 10 MG tablet       Take 2 tablets (20 mg total) by mouth nightly.    Take 1 tablet (10 mg total) by mouth nightly.       Start Date: 12/9/2022 End Date: --    Start Date: 8/12/2022 End Date: 12/9/2022    LEFLUNOMIDE (ARAVA) 20 MG TAB leflunomide (ARAVA) 20 MG Tab       Take 1 tablet (20 mg total) by mouth once daily.    Take 1 tablet (20 mg total) by mouth once daily.       Start Date: 12/9/2022 End Date: --    Start Date: 8/12/2022 End Date: 12/9/2022    OMEPRAZOLE (PRILOSEC) 40 MG CAPSULE omeprazole (PRILOSEC) 40 MG capsule       Take 1 capsule (40 mg total) by mouth every morning.    Take 1 capsule (40 mg total) by mouth once daily at 6am.       Start Date: 12/9/2022 End Date: --    Start Date: 8/12/2022 End Date: 12/9/2022         Plan:         Problem List Items Addressed This Visit          Psychiatric    Attention deficit hyperactivity disorder (ADHD), predominantly inattentive type    Relevant Medications    cyclobenzaprine (FLEXERIL) 10 MG tablet    leflunomide (ARAVA) 20 MG Tab    omeprazole (PRILOSEC) 40 MG capsule       Derm    Psoriasis    Relevant Medications    cyclobenzaprine (FLEXERIL) 10 MG tablet    leflunomide (ARAVA) 20 MG Tab    omeprazole (PRILOSEC) 40 MG capsule       Renal/    Renal insufficiency    Relevant Medications    cyclobenzaprine (FLEXERIL) 10 MG tablet    leflunomide (ARAVA) 20 MG Tab    omeprazole (PRILOSEC) 40 MG capsule       Orthopedic    Psoriatic arthritis - Primary    Relevant Medications    cyclobenzaprine (FLEXERIL) 10 MG tablet    leflunomide (ARAVA) 20 MG Tab    omeprazole (PRILOSEC) 40 MG capsule       Other    Sleep apnea     Relevant Medications    cyclobenzaprine (FLEXERIL) 10 MG tablet    leflunomide (ARAVA) 20 MG Tab    omeprazole (PRILOSEC) 40 MG capsule

## 2023-01-22 PROBLEM — Z00.00 ENCOUNTER FOR WELLNESS EXAMINATION IN ADULT: Status: ACTIVE | Noted: 2023-01-22

## 2023-01-22 PROBLEM — E66.09 OBESITY DUE TO EXCESS CALORIES WITHOUT SERIOUS COMORBIDITY: Status: ACTIVE | Noted: 2023-01-22

## 2023-01-23 PROBLEM — Z12.5 SCREENING PSA (PROSTATE SPECIFIC ANTIGEN): Status: ACTIVE | Noted: 2023-01-23

## 2023-01-23 PROBLEM — Z23 IMMUNIZATION DUE: Status: ACTIVE | Noted: 2023-01-23

## 2023-01-23 PROBLEM — Z12.11 COLON CANCER SCREENING: Status: ACTIVE | Noted: 2023-01-23

## 2023-04-13 NOTE — PROGRESS NOTES
"Subjective:           Patient ID: Scott Plaza is a 52 y.o. male.    Chief Complaint: Follow-up (Joint pain , bilateral hands , right foot pain at the top of the toes. )      Mr. Plaza is a 52-year-old male here for follow-up.  He was initially referred by Dr. Hadley Rodriguez for joint pain.  He established care with Dr. Rudd on 08/17/2021. Past labs: MARIBETH neg, dsDNA neg, RF Neg. He was initiated on Leflunomide at that visit and has been stable on this treatment. Psoriasis 20% bsa Today he reports joint pain involving the MCP PIP wrist elbow shoulders hips knees and ankles bilaterally.  The pain is 2/10 in intensity dull in quality and continuous.  That is associated with a morning stiffness lasting for less than 60 minutes.  He reports sleeping well with the recent increase with flexeril that he takes nightly.  This has been associated with myalgias.  Muscle aches are 4/10 in intensity dull in quality and continuous. Denies fever and chills. No recent labs to review         Review of Systems   Constitutional:  Negative for appetite change, chills and fever.   HENT:  Negative for congestion, ear pain, mouth sores, nosebleeds and trouble swallowing.    Eyes:  Negative for photophobia and discharge.   Respiratory:  Negative for chest tightness and shortness of breath.    Cardiovascular:  Negative for chest pain.   Gastrointestinal:  Negative for abdominal pain and vomiting.   Endocrine: Negative.    Genitourinary:  Negative for hematuria.   Musculoskeletal:         As per HPI   Skin:  Negative for rash.   Neurological:  Negative for weakness.       Objective:   BP (!) 130/90 (BP Location: Left arm, Patient Position: Sitting, BP Method: Medium (Manual))   Pulse 106   Temp 98.2 °F (36.8 °C) (Oral)   Resp 18   Ht 5' 9" (1.753 m)   Wt 104.1 kg (229 lb 9.6 oz)   SpO2 98%   BMI 33.91 kg/m²          Physical Exam   Constitutional: He is oriented to person, place, and time. He appears well-developed and " well-nourished. No distress.   HENT:   Head: Normocephalic and atraumatic.   Right Ear: External ear normal.   Left Ear: External ear normal.   Eyes: Pupils are equal, round, and reactive to light.   Cardiovascular: Normal rate.   Pulmonary/Chest: Effort normal.   Abdominal: Soft. There is no abdominal tenderness.   Musculoskeletal:      Right shoulder: Normal.      Left shoulder: Normal.      Right elbow: Normal.      Left elbow: Normal.      Right wrist: Normal.      Left wrist: Normal.      Cervical back: Neck supple.      Right knee: Normal.      Left knee: Normal.   Lymphadenopathy:     He has no cervical adenopathy.   Neurological: He is alert and oriented to person, place, and time. He displays normal reflexes. No cranial nerve deficit or sensory deficit. He exhibits normal muscle tone. Coordination normal.   Skin: No rash noted. No erythema.    Psoriasis 20% bsa    Vitals reviewed.      Right Side Rheumatological Exam     Examination finds the shoulder, elbow, wrist, knee, 1st PIP, 1st MCP, 2nd PIP, 2nd MCP, 3rd PIP, 3rd MCP, 4th PIP, 4th MCP, 5th PIP, 5th MCP and ankle normal.    Left Side Rheumatological Exam     Examination finds the shoulder, elbow, wrist, knee, 1st PIP, 1st MCP, 2nd PIP, 2nd MCP, 3rd PIP, 3rd MCP, 4th PIP, 4th MCP, 5th PIP, 5th MCP, temporomandibular, ankle and 1st MTP normal.         Completed Fibromyalgia exam 2/18 tender points.    No data to display     Assessment:         Medication List with Changes/Refills   Current Medications    ASPIRIN (ECOTRIN) 81 MG EC TABLET    Take 81 mg by mouth once daily.       Start Date: --        End Date: --    CLOBETASOL (TEMOVATE) 0.05 % CREAM    Apply topically every evening.       Start Date: 12/9/2022 End Date: --    FLUTICASONE PROPIONATE (FLONASE) 50 MCG/ACTUATION NASAL SPRAY    1 spray by Each Nostril route once daily at 6am.       Start Date: 8/11/2022 End Date: --    OMEPRAZOLE (PRILOSEC) 40 MG CAPSULE    Take 1 capsule (40 mg total) by  mouth every morning.       Start Date: 12/9/2022 End Date: --    ROSUVASTATIN (CRESTOR) 10 MG TABLET    Take 10 mg by mouth nightly.       Start Date: 8/11/2022 End Date: --   Changed and/or Refilled Medications    Modified Medication Previous Medication    CYCLOBENZAPRINE (FLEXERIL) 10 MG TABLET cyclobenzaprine (FLEXERIL) 10 MG tablet       Take 2 tablets (20 mg total) by mouth every evening.    Take 20 mg by mouth every evening.       Start Date: 4/14/2023 End Date: --    Start Date: 4/3/2023  End Date: 4/14/2023    LEFLUNOMIDE (ARAVA) 20 MG TAB leflunomide (ARAVA) 20 MG Tab       Take 1 tablet (20 mg total) by mouth once daily.    Take 1 tablet (20 mg total) by mouth once daily.       Start Date: 4/14/2023 End Date: --    Start Date: 12/9/2022 End Date: 4/14/2023         ICD-10-CM ICD-9-CM   1. Psoriatic arthritis  L40.50 696.0   2. Psoriasis  L40.9 696.1   3. Myalgia  M79.10 729.1           Plan:       1. Psoriatic arthritis  Overview:  MARIBETH neg, dsDNA neg, RF Neg.   Stable on Leflunomide     Assessment & Plan:  Continue leflunomide 20 mg daily after supper    Labs ordered today for continued monitoring.     Orders:  -     CBC Auto Differential; Future; Expected date: 04/14/2023  -     Comprehensive Metabolic Panel; Future; Expected date: 04/14/2023  -     leflunomide (ARAVA) 20 MG Tab; Take 1 tablet (20 mg total) by mouth once daily.  Dispense: 30 tablet; Refill: 5    2. Psoriasis  Overview:  Stable; followed by Dr. Jennings.    Assessment & Plan:  Continue clobetasol 0.05% cream nightly p.r.n.    Orders:  -     leflunomide (ARAVA) 20 MG Tab; Take 1 tablet (20 mg total) by mouth once daily.  Dispense: 30 tablet; Refill: 5    3. Myalgia  Assessment & Plan:  Continue Flexeril 20 mg BID Nightly      Other orders  -     cyclobenzaprine (FLEXERIL) 10 MG tablet; Take 2 tablets (20 mg total) by mouth every evening.  Dispense: 60 tablet; Refill: 5

## 2023-04-14 ENCOUNTER — OFFICE VISIT (OUTPATIENT)
Dept: RHEUMATOLOGY | Facility: CLINIC | Age: 53
End: 2023-04-14
Payer: COMMERCIAL

## 2023-04-14 VITALS
WEIGHT: 229.63 LBS | HEART RATE: 106 BPM | HEIGHT: 69 IN | RESPIRATION RATE: 18 BRPM | TEMPERATURE: 98 F | DIASTOLIC BLOOD PRESSURE: 90 MMHG | OXYGEN SATURATION: 98 % | SYSTOLIC BLOOD PRESSURE: 130 MMHG | BODY MASS INDEX: 34.01 KG/M2

## 2023-04-14 DIAGNOSIS — L40.50 PSORIATIC ARTHRITIS: ICD-10-CM

## 2023-04-14 DIAGNOSIS — L40.9 PSORIASIS: ICD-10-CM

## 2023-04-14 DIAGNOSIS — M79.10 MYALGIA: ICD-10-CM

## 2023-04-14 PROCEDURE — 99999 PR PBB SHADOW E&M-EST. PATIENT-LVL IV: CPT | Mod: PBBFAC,,,

## 2023-04-14 PROCEDURE — 99999 PR PBB SHADOW E&M-EST. PATIENT-LVL IV: ICD-10-PCS | Mod: PBBFAC,,,

## 2023-04-14 PROCEDURE — 3075F SYST BP GE 130 - 139MM HG: CPT | Mod: CPTII,S$GLB,,

## 2023-04-14 PROCEDURE — 3080F DIAST BP >= 90 MM HG: CPT | Mod: CPTII,S$GLB,,

## 2023-04-14 PROCEDURE — 3080F PR MOST RECENT DIASTOLIC BLOOD PRESSURE >= 90 MM HG: ICD-10-PCS | Mod: CPTII,S$GLB,,

## 2023-04-14 PROCEDURE — 1159F MED LIST DOCD IN RCRD: CPT | Mod: CPTII,S$GLB,,

## 2023-04-14 PROCEDURE — 99213 PR OFFICE/OUTPT VISIT, EST, LEVL III, 20-29 MIN: ICD-10-PCS | Mod: S$GLB,,,

## 2023-04-14 PROCEDURE — 99213 OFFICE O/P EST LOW 20 MIN: CPT | Mod: S$GLB,,,

## 2023-04-14 PROCEDURE — 3075F PR MOST RECENT SYSTOLIC BLOOD PRESS GE 130-139MM HG: ICD-10-PCS | Mod: CPTII,S$GLB,,

## 2023-04-14 PROCEDURE — 3008F PR BODY MASS INDEX (BMI) DOCUMENTED: ICD-10-PCS | Mod: CPTII,S$GLB,,

## 2023-04-14 PROCEDURE — 1159F PR MEDICATION LIST DOCUMENTED IN MEDICAL RECORD: ICD-10-PCS | Mod: CPTII,S$GLB,,

## 2023-04-14 PROCEDURE — 3008F BODY MASS INDEX DOCD: CPT | Mod: CPTII,S$GLB,,

## 2023-04-14 RX ORDER — CYCLOBENZAPRINE HCL 10 MG
20 TABLET ORAL NIGHTLY
Qty: 60 TABLET | Refills: 5 | Status: SHIPPED | OUTPATIENT
Start: 2023-04-14 | End: 2023-09-27 | Stop reason: SDUPTHER

## 2023-04-14 RX ORDER — CYCLOBENZAPRINE HCL 10 MG
20 TABLET ORAL NIGHTLY
COMMUNITY
Start: 2023-04-03 | End: 2023-04-14 | Stop reason: SDUPTHER

## 2023-04-14 RX ORDER — LEFLUNOMIDE 20 MG/1
20 TABLET ORAL DAILY
Qty: 30 TABLET | Refills: 5 | Status: SHIPPED | OUTPATIENT
Start: 2023-04-14 | End: 2023-09-27 | Stop reason: SDUPTHER

## 2023-04-20 ENCOUNTER — TELEPHONE (OUTPATIENT)
Dept: RHEUMATOLOGY | Facility: CLINIC | Age: 53
End: 2023-04-20
Payer: COMMERCIAL

## 2023-04-20 NOTE — TELEPHONE ENCOUNTER
Please let patient know that one of his liver functions is slightly elevated. We will continue the leflunomide. He should avoid/minimize drinking alcohol, taking Tylenol, and continue exercise / healthy diet. I will also send his labs to his PCP as his kidneys creatinine has increased since last time those were checked. No changes from his last office visit.  Thanks

## 2023-04-24 PROBLEM — Z00.00 ENCOUNTER FOR WELLNESS EXAMINATION IN ADULT: Status: RESOLVED | Noted: 2023-01-22 | Resolved: 2023-04-24

## 2023-07-05 DIAGNOSIS — R13.14 DYSPHAGIA, PHARYNGOESOPHAGEAL PHASE: Primary | ICD-10-CM

## 2023-07-05 PROBLEM — Z79.899 DRUG-INDUCED IMMUNODEFICIENCY: Status: ACTIVE | Noted: 2023-07-05

## 2023-07-05 PROBLEM — R79.89 ELEVATED SERUM CREATININE: Status: ACTIVE | Noted: 2023-07-05

## 2023-07-05 PROBLEM — R22.32: Status: ACTIVE | Noted: 2023-07-05

## 2023-07-05 PROBLEM — D84.821 DRUG-INDUCED IMMUNODEFICIENCY: Status: ACTIVE | Noted: 2023-07-05

## 2023-07-05 PROBLEM — R74.8 ELEVATED LIVER ENZYMES: Status: ACTIVE | Noted: 2023-07-05

## 2023-07-11 ENCOUNTER — CLINICAL SUPPORT (OUTPATIENT)
Dept: REHABILITATION | Facility: HOSPITAL | Age: 53
End: 2023-07-11
Attending: INTERNAL MEDICINE
Payer: COMMERCIAL

## 2023-07-11 ENCOUNTER — HOSPITAL ENCOUNTER (OUTPATIENT)
Dept: RADIOLOGY | Facility: HOSPITAL | Age: 53
Discharge: HOME | End: 2023-07-11
Attending: INTERNAL MEDICINE
Payer: COMMERCIAL

## 2023-07-11 DIAGNOSIS — R13.14 DYSPHAGIA, PHARYNGOESOPHAGEAL PHASE: ICD-10-CM

## 2023-07-11 PROCEDURE — 25500020 PHARM REV CODE 255: Performed by: INTERNAL MEDICINE

## 2023-07-11 PROCEDURE — 74230 X-RAY XM SWLNG FUNCJ C+: CPT | Mod: TC

## 2023-07-11 PROCEDURE — 92611 MOTION FLUOROSCOPY/SWALLOW: CPT

## 2023-07-11 PROCEDURE — A9698 NON-RAD CONTRAST MATERIALNOC: HCPCS | Performed by: INTERNAL MEDICINE

## 2023-07-11 RX ADMIN — BARIUM SULFATE 10 ML: 0.81 POWDER, FOR SUSPENSION ORAL at 09:07

## 2023-07-11 NOTE — PROGRESS NOTES
"Speech Language Pathology Department  Modified Barium Swallow (MBS) Study    Patient Name:  Scott Plaza   MRN:  [id number]    Recommendations:     General recommendations:   follow up with scheduled esophagram and GI POC  Diet recommendations: regular solids and thin liquids  Swallow strategies/precautions: medications per patient preference  General precautions: Standard,      History/Reason for Referral:     Scott Plaza is a/n 52 y.o. male presents for MBS completion.  Pt reports "things getting stuck" pointing to mid-chest area.  Pt reports he recently had an EGD which was unrevealing and is scheduled for esophagram this coming Thursday.  Past Medical History:   Diagnosis Date    Anxiety     Depression     GERD (gastroesophageal reflux disease)     Psoriasis     Psoriatic arthritis     Renal insufficiency     Sleep apnea      Past Surgical History:   Procedure Laterality Date    NASAL SINUS SURGERY  09/23/2020    nissen funditlication      TONSILLECTOMY       A MBS Study was completed to assess the efficiency of his swallow function, rule out aspiration and make recommendations regarding safe dietary consistencies, effective compensatory strategies, and safe eating environment.     Home Diet: Regular and thin liquids    Patient complaint: "Why do things feel like they are getting stuck?"    Imaging   No results found for this or any previous visit.    No results found for this or any previous visit.    No results found for this or any previous visit.    Subjective:     Patient awake, alert, calm, and cooperative.    Spiritual/Cultural/Muslim Beliefs/Practices that affect care:  none  Pain/Comfort:  none    Respiratory Status: room air    Fluoroscopic Results:     Oral Musculature  Dentition: own teeth  Secretion Management: adequate  Mucosal Quality: good  Facial Movement: WFL  Buccal Strength & Mobility: WFL  Mandibular Strength & Mobility: WFL  Oral Labial Strength & Mobility: WFL  Lingual " Strength & Mobility: WFL  Velar Elevation: WFL  Vocal Quality: adequate      Positioning:  upright in chair  Visualization  Patient was seen in the lateral view    Oral Phase:   Reduced bolus control with prespill to the valleculae    Pharyngeal Phase:   Swallow delay with spill to the vallecuale  Reduced base of tongue retraction  Consistency Laryngeal Penetration Aspiration Residue   Thin liquid by cup None None Mild  Valleculae   Puree None None None   Chewable solid None None Mild  Valleculae       Cervical Esophageal Phase:   residual material visualized      Assessment:     Oropharyngeal swallow WFL with no laryngeal penetration or aspiration visualized during this study. Both swallow safety and swallow efficiency are preserved,    Patient appears to be at low risk for aspiration related pneumonia when considering adequate oral health status, adequate overall health/immune status, adequate laryngeal vestibule closure, and severity of dysphagia.  Prognosis for behavioral swallow rehabilitation is good.    Patient Education:     Patient provided with verbal education regarding MBS results.  Understanding was verbalized.    Plan:     Skilled SLP services not warranted.  Rec: follow up with GI as  residual material in esophagus visualized after swallow.    Time Tracking:     SLP Treatment Date:    7/11/23  Speech Start Time:   0900  Speech Stop Time:      0920  Speech Total Time (min):   20 min    Billable minutes:   Motion Fluoroscopic Evaluation, Video Recording, 20 minutes     07/11/2023

## 2023-07-13 ENCOUNTER — HOSPITAL ENCOUNTER (OUTPATIENT)
Dept: RADIOLOGY | Facility: HOSPITAL | Age: 53
Discharge: HOME | End: 2023-07-13
Attending: INTERNAL MEDICINE
Payer: COMMERCIAL

## 2023-07-13 DIAGNOSIS — R13.14 DYSPHAGIA, PHARYNGOESOPHAGEAL PHASE: ICD-10-CM

## 2023-07-13 PROCEDURE — 25500020 PHARM REV CODE 255: Performed by: INTERNAL MEDICINE

## 2023-07-13 PROCEDURE — 74220 X-RAY XM ESOPHAGUS 1CNTRST: CPT | Mod: TC

## 2023-07-13 PROCEDURE — A9698 NON-RAD CONTRAST MATERIALNOC: HCPCS | Performed by: INTERNAL MEDICINE

## 2023-07-13 RX ADMIN — BARIUM SULFATE 100 ML: 0.6 SUSPENSION ORAL at 10:07

## 2023-07-13 RX ADMIN — BARIUM SULFATE 100 ML: 980 POWDER, FOR SUSPENSION ORAL at 09:07

## 2023-09-27 ENCOUNTER — OFFICE VISIT (OUTPATIENT)
Dept: RHEUMATOLOGY | Facility: CLINIC | Age: 53
End: 2023-09-27
Payer: COMMERCIAL

## 2023-09-27 VITALS
BODY MASS INDEX: 35.22 KG/M2 | SYSTOLIC BLOOD PRESSURE: 142 MMHG | DIASTOLIC BLOOD PRESSURE: 94 MMHG | TEMPERATURE: 99 F | HEART RATE: 96 BPM | HEIGHT: 69 IN | OXYGEN SATURATION: 98 % | WEIGHT: 237.81 LBS

## 2023-09-27 DIAGNOSIS — K21.9 GASTROESOPHAGEAL REFLUX DISEASE WITHOUT ESOPHAGITIS: ICD-10-CM

## 2023-09-27 DIAGNOSIS — M79.10 MYALGIA: ICD-10-CM

## 2023-09-27 DIAGNOSIS — G47.30 SLEEP APNEA, UNSPECIFIED TYPE: ICD-10-CM

## 2023-09-27 DIAGNOSIS — F90.0 ATTENTION DEFICIT HYPERACTIVITY DISORDER (ADHD), PREDOMINANTLY INATTENTIVE TYPE: ICD-10-CM

## 2023-09-27 DIAGNOSIS — G47.33 OBSTRUCTIVE SLEEP APNEA SYNDROME: ICD-10-CM

## 2023-09-27 DIAGNOSIS — N28.9 RENAL INSUFFICIENCY: Primary | ICD-10-CM

## 2023-09-27 DIAGNOSIS — L40.9 PSORIASIS: ICD-10-CM

## 2023-09-27 DIAGNOSIS — L40.50 PSORIATIC ARTHRITIS: ICD-10-CM

## 2023-09-27 PROCEDURE — 3008F BODY MASS INDEX DOCD: CPT | Mod: CPTII,S$GLB,, | Performed by: INTERNAL MEDICINE

## 2023-09-27 PROCEDURE — 3008F PR BODY MASS INDEX (BMI) DOCUMENTED: ICD-10-PCS | Mod: CPTII,S$GLB,, | Performed by: INTERNAL MEDICINE

## 2023-09-27 PROCEDURE — 3080F DIAST BP >= 90 MM HG: CPT | Mod: CPTII,S$GLB,, | Performed by: INTERNAL MEDICINE

## 2023-09-27 PROCEDURE — 3077F SYST BP >= 140 MM HG: CPT | Mod: CPTII,S$GLB,, | Performed by: INTERNAL MEDICINE

## 2023-09-27 PROCEDURE — 99999 PR PBB SHADOW E&M-EST. PATIENT-LVL III: CPT | Mod: PBBFAC,,, | Performed by: INTERNAL MEDICINE

## 2023-09-27 PROCEDURE — 99999 PR PBB SHADOW E&M-EST. PATIENT-LVL III: ICD-10-PCS | Mod: PBBFAC,,, | Performed by: INTERNAL MEDICINE

## 2023-09-27 PROCEDURE — 3077F PR MOST RECENT SYSTOLIC BLOOD PRESSURE >= 140 MM HG: ICD-10-PCS | Mod: CPTII,S$GLB,, | Performed by: INTERNAL MEDICINE

## 2023-09-27 PROCEDURE — 99214 PR OFFICE/OUTPT VISIT, EST, LEVL IV, 30-39 MIN: ICD-10-PCS | Mod: S$GLB,,, | Performed by: INTERNAL MEDICINE

## 2023-09-27 PROCEDURE — 1159F MED LIST DOCD IN RCRD: CPT | Mod: CPTII,S$GLB,, | Performed by: INTERNAL MEDICINE

## 2023-09-27 PROCEDURE — 3080F PR MOST RECENT DIASTOLIC BLOOD PRESSURE >= 90 MM HG: ICD-10-PCS | Mod: CPTII,S$GLB,, | Performed by: INTERNAL MEDICINE

## 2023-09-27 PROCEDURE — 99214 OFFICE O/P EST MOD 30 MIN: CPT | Mod: S$GLB,,, | Performed by: INTERNAL MEDICINE

## 2023-09-27 PROCEDURE — 1159F PR MEDICATION LIST DOCUMENTED IN MEDICAL RECORD: ICD-10-PCS | Mod: CPTII,S$GLB,, | Performed by: INTERNAL MEDICINE

## 2023-09-27 RX ORDER — SULFASALAZINE 500 MG/1
1000 TABLET ORAL
Qty: 180 TABLET | Refills: 3 | Status: SHIPPED | OUTPATIENT
Start: 2023-09-27 | End: 2024-09-21

## 2023-09-27 RX ORDER — CLOBETASOL PROPIONATE 0.5 MG/G
CREAM TOPICAL NIGHTLY
Qty: 120 G | Refills: 5 | Status: SHIPPED | OUTPATIENT
Start: 2023-09-27

## 2023-09-27 RX ORDER — LEFLUNOMIDE 20 MG/1
20 TABLET ORAL DAILY
Qty: 90 TABLET | Refills: 3 | Status: SHIPPED | OUTPATIENT
Start: 2023-09-27

## 2023-09-27 RX ORDER — OMEPRAZOLE 40 MG/1
40 CAPSULE, DELAYED RELEASE ORAL
Qty: 180 CAPSULE | Refills: 3 | Status: SHIPPED | OUTPATIENT
Start: 2023-09-27

## 2023-09-27 RX ORDER — CYCLOBENZAPRINE HCL 10 MG
20 TABLET ORAL NIGHTLY
Qty: 180 TABLET | Refills: 3 | Status: SHIPPED | OUTPATIENT
Start: 2023-09-27

## 2023-09-27 NOTE — PROGRESS NOTES
"Subjective:       Patient ID: Scott Plaza is a 52 y.o. male.    Chief Complaint: Follow-up (Follow up. Patient states that he wants the results from his labs in April. No complaints )    The patient is complaining of joint pain involving the MCP PIP wrist elbow shoulders hips knees and ankles bilaterally.  The pain is 2/10 in intensity dull in quality and continuous.  That is associated with a morning stiffness lasting for more than 60 minutes.  Is also having difficulty maintaining a good night of sleep.  This has been associated with myalgias.  Muscle aches are 2/10 in intensity dull in quality and continuous.  They are associated with fatigue.  No fever no chills no others.  New psoriasis on the scalp   Labs checked during this visit         Review of Systems   Constitutional:  Negative for appetite change, chills and fever.   HENT:  Negative for congestion, ear pain, mouth sores, nosebleeds and trouble swallowing.    Eyes:  Negative for photophobia and discharge.   Respiratory:  Negative for chest tightness and shortness of breath.    Cardiovascular:  Negative for chest pain.   Gastrointestinal:  Negative for abdominal pain and vomiting.   Endocrine: Negative.    Genitourinary:  Negative for hematuria.   Musculoskeletal:         As per HPI   Skin:  Negative for rash.        New psoriasis on the scalp    Neurological:  Negative for weakness.         Objective:   BP (!) 142/94 (BP Location: Left arm, Patient Position: Sitting, BP Method: Medium (Automatic))   Pulse 96   Temp 98.6 °F (37 °C) (Oral)   Ht 5' 9" (1.753 m)   Wt 107.9 kg (237 lb 12.8 oz)   SpO2 98%   BMI 35.12 kg/m²      Physical Exam   Constitutional: He is oriented to person, place, and time. He appears well-developed and well-nourished. No distress.   HENT:   Head: Normocephalic and atraumatic.   Right Ear: External ear normal.   Left Ear: External ear normal.   Eyes: Pupils are equal, round, and reactive to light.   Cardiovascular: " Normal rate, regular rhythm and normal heart sounds.   Pulmonary/Chest: Breath sounds normal.   Abdominal: Soft. There is no abdominal tenderness.   Musculoskeletal:      Right shoulder: Tenderness present.      Left shoulder: Tenderness present.      Right elbow: Tenderness present.      Left elbow: Tenderness present.      Right wrist: Tenderness present.      Left wrist: Tenderness present.      Cervical back: Neck supple.      Right hip: Tenderness present.      Left hip: Tenderness present.      Right knee: Tenderness present.      Left knee: Tenderness present.      Right ankle: Tenderness present.      Left ankle: Tenderness present.   Lymphadenopathy:     He has no cervical adenopathy.   Neurological: He is alert and oriented to person, place, and time. He displays normal reflexes. No cranial nerve deficit or sensory deficit. He exhibits normal muscle tone. Coordination normal.   Skin: No rash noted. No erythema.   New psoriasis on the scalp    Vitals reviewed.      Right Side Rheumatological Exam     The patient is tender to palpation of the shoulder, elbow, wrist, knee, 1st PIP, 1st MCP, 2nd PIP, 2nd MCP, 3rd PIP, 3rd MCP, 4th PIP, 4th MCP, 5th PIP, hip, ankle, 1st MTP, 2nd MTP, 3rd MTP, 4th MTP, 5th MTP, 1st toe IP, 2nd toe IP, 3rd toe IP, 4th toe IP and 5th toe IP    Left Side Rheumatological Exam     The patient is tender to palpation of the shoulder, elbow, wrist, knee, 1st PIP, 1st MCP, 2nd PIP, 2nd MCP, 3rd PIP, 3rd MCP, 4th PIP, 4th MCP, 5th PIP, 5th MCP, hip, ankle, 1st MTP, 2nd MTP, 3rd MTP, 4th MTP, 5th MTP, 1st toe IP, 2nd toe IP, 3rd toe IP, 4th toe IP and 5th toe IP.         Completed Fibromyalgia exam 18/18 tender points.  No data to display     Assessment:       1. Renal insufficiency    2. Psoriatic arthritis    3. Obstructive sleep apnea syndrome    4. Psoriasis    5. Attention deficit hyperactivity disorder (ADHD), predominantly inattentive type    6. Gastroesophageal reflux disease  without esophagitis    7. Myalgia    8. Sleep apnea, unspecified type          Medication List with Changes/Refills   New Medications    SULFASALAZINE (AZULFIDINE) 500 MG TAB    Take 2 tablets (1,000 mg total) by mouth daily with dinner or evening meal.       Start Date: 9/27/2023 End Date: 9/21/2024   Current Medications    ASPIRIN (ECOTRIN) 81 MG EC TABLET    Take 81 mg by mouth once daily.       Start Date: --        End Date: --    FLUTICASONE PROPIONATE (FLONASE) 50 MCG/ACTUATION NASAL SPRAY    1 spray by Each Nostril route once daily at 6am.       Start Date: 8/11/2022 End Date: --    ROSUVASTATIN (CRESTOR) 10 MG TABLET    Take 10 mg by mouth nightly.       Start Date: 8/11/2022 End Date: --   Changed and/or Refilled Medications    Modified Medication Previous Medication    CLOBETASOL (TEMOVATE) 0.05 % CREAM clobetasoL (TEMOVATE) 0.05 % cream       Apply topically every evening.    Apply topically every evening.       Start Date: 9/27/2023 End Date: --    Start Date: 12/9/2022 End Date: 9/27/2023    CYCLOBENZAPRINE (FLEXERIL) 10 MG TABLET cyclobenzaprine (FLEXERIL) 10 MG tablet       Take 2 tablets (20 mg total) by mouth every evening.    Take 2 tablets (20 mg total) by mouth every evening.       Start Date: 9/27/2023 End Date: --    Start Date: 4/14/2023 End Date: 9/27/2023    LEFLUNOMIDE (ARAVA) 20 MG TAB leflunomide (ARAVA) 20 MG Tab       Take 1 tablet (20 mg total) by mouth once daily.    Take 1 tablet (20 mg total) by mouth once daily.       Start Date: 9/27/2023 End Date: --    Start Date: 4/14/2023 End Date: 9/27/2023    OMEPRAZOLE (PRILOSEC) 40 MG CAPSULE omeprazole (PRILOSEC) 40 MG capsule       Take 1 capsule (40 mg total) by mouth 2 (two) times daily before meals.    Take 1 tablet by mouth twice daily.       Start Date: 9/27/2023 End Date: --    Start Date: 7/5/2023  End Date: 9/27/2023         Plan:         Problem List Items Addressed This Visit          Psychiatric    Attention deficit  hyperactivity disorder (ADHD), predominantly inattentive type    Relevant Medications    clobetasoL (TEMOVATE) 0.05 % cream    cyclobenzaprine (FLEXERIL) 10 MG tablet    leflunomide (ARAVA) 20 MG Tab    omeprazole (PRILOSEC) 40 MG capsule    sulfaSALAzine (AZULFIDINE) 500 mg Tab    Other Relevant Orders    CBC Auto Differential    Comprehensive Metabolic Panel    CRP, High Sensitivity    Vitamin D    Antinuclear Ab, HEp-2 Substrate    Hepatitis B Surface Antigen    Hepatitis C Antibody    TSH    T4, Free    DSDNA    C4 Complement    C3 Complement    Urinalysis    Protein/Creatinine Ratio, Urine    Uric Acid    T3, Free (OLG)       Derm    Psoriasis    Relevant Medications    clobetasoL (TEMOVATE) 0.05 % cream    cyclobenzaprine (FLEXERIL) 10 MG tablet    leflunomide (ARAVA) 20 MG Tab    omeprazole (PRILOSEC) 40 MG capsule    sulfaSALAzine (AZULFIDINE) 500 mg Tab    Other Relevant Orders    CBC Auto Differential    Comprehensive Metabolic Panel    CRP, High Sensitivity    Vitamin D    Antinuclear Ab, HEp-2 Substrate    Hepatitis B Surface Antigen    Hepatitis C Antibody    TSH    T4, Free    DSDNA    C4 Complement    C3 Complement    Urinalysis    Protein/Creatinine Ratio, Urine    Uric Acid    T3, Free (OLG)       Renal/    Renal insufficiency - Primary    Relevant Medications    clobetasoL (TEMOVATE) 0.05 % cream    cyclobenzaprine (FLEXERIL) 10 MG tablet    leflunomide (ARAVA) 20 MG Tab    omeprazole (PRILOSEC) 40 MG capsule    sulfaSALAzine (AZULFIDINE) 500 mg Tab    Other Relevant Orders    CBC Auto Differential    Comprehensive Metabolic Panel    CRP, High Sensitivity    Vitamin D    Antinuclear Ab, HEp-2 Substrate    Hepatitis B Surface Antigen    Hepatitis C Antibody    TSH    T4, Free    DSDNA    C4 Complement    C3 Complement    Urinalysis    Protein/Creatinine Ratio, Urine    Uric Acid    T3, Free (OLG)       GI    GERD (gastroesophageal reflux disease)    Relevant Medications    clobetasoL (TEMOVATE)  0.05 % cream    cyclobenzaprine (FLEXERIL) 10 MG tablet    leflunomide (ARAVA) 20 MG Tab    omeprazole (PRILOSEC) 40 MG capsule    sulfaSALAzine (AZULFIDINE) 500 mg Tab    Other Relevant Orders    CBC Auto Differential    Comprehensive Metabolic Panel    CRP, High Sensitivity    Vitamin D    Antinuclear Ab, HEp-2 Substrate    Hepatitis B Surface Antigen    Hepatitis C Antibody    TSH    T4, Free    DSDNA    C4 Complement    C3 Complement    Urinalysis    Protein/Creatinine Ratio, Urine    Uric Acid    T3, Free (OLG)       Orthopedic    Psoriatic arthritis    Relevant Medications    clobetasoL (TEMOVATE) 0.05 % cream    cyclobenzaprine (FLEXERIL) 10 MG tablet    leflunomide (ARAVA) 20 MG Tab    omeprazole (PRILOSEC) 40 MG capsule    sulfaSALAzine (AZULFIDINE) 500 mg Tab    Other Relevant Orders    CBC Auto Differential    Comprehensive Metabolic Panel    CRP, High Sensitivity    Vitamin D    Antinuclear Ab, HEp-2 Substrate    Hepatitis B Surface Antigen    Hepatitis C Antibody    TSH    T4, Free    DSDNA    C4 Complement    C3 Complement    Urinalysis    Protein/Creatinine Ratio, Urine    Uric Acid    T3, Free (OLG)    Myalgia    Relevant Medications    clobetasoL (TEMOVATE) 0.05 % cream    cyclobenzaprine (FLEXERIL) 10 MG tablet    leflunomide (ARAVA) 20 MG Tab    omeprazole (PRILOSEC) 40 MG capsule    sulfaSALAzine (AZULFIDINE) 500 mg Tab    Other Relevant Orders    CBC Auto Differential    Comprehensive Metabolic Panel    CRP, High Sensitivity    Vitamin D    Antinuclear Ab, HEp-2 Substrate    Hepatitis B Surface Antigen    Hepatitis C Antibody    TSH    T4, Free    DSDNA    C4 Complement    C3 Complement    Urinalysis    Protein/Creatinine Ratio, Urine    Uric Acid    T3, Free (OLG)       Other    Sleep apnea    Relevant Medications    clobetasoL (TEMOVATE) 0.05 % cream    cyclobenzaprine (FLEXERIL) 10 MG tablet    leflunomide (ARAVA) 20 MG Tab    omeprazole (PRILOSEC) 40 MG capsule    sulfaSALAzine (AZULFIDINE)  500 mg Tab    Other Relevant Orders    CBC Auto Differential    Comprehensive Metabolic Panel    CRP, High Sensitivity    Vitamin D    Antinuclear Ab, HEp-2 Substrate    Hepatitis B Surface Antigen    Hepatitis C Antibody    TSH    T4, Free    DSDNA    C4 Complement    C3 Complement    Urinalysis    Protein/Creatinine Ratio, Urine    Uric Acid    T3, Free ([facility]G)    CBC Auto Differential    Comprehensive Metabolic Panel    CRP, High Sensitivity    Vitamin D    Antinuclear Ab, HEp-2 Substrate    Hepatitis B Surface Antigen    Hepatitis C Antibody    TSH    T4, Free    DSDNA    C4 Complement    C3 Complement    Urinalysis    Protein/Creatinine Ratio, Urine    Uric Acid    T3, Free ([facility])

## 2023-10-03 ENCOUNTER — LAB VISIT (OUTPATIENT)
Dept: LAB | Facility: HOSPITAL | Age: 53
End: 2023-10-03
Attending: INTERNAL MEDICINE
Payer: COMMERCIAL

## 2023-10-03 DIAGNOSIS — L40.50 PSORIATIC ARTHRITIS: ICD-10-CM

## 2023-10-03 DIAGNOSIS — N28.9 RENAL INSUFFICIENCY: ICD-10-CM

## 2023-10-03 DIAGNOSIS — L40.9 PSORIASIS: ICD-10-CM

## 2023-10-03 DIAGNOSIS — M79.10 MYALGIA: ICD-10-CM

## 2023-10-03 DIAGNOSIS — G47.30 SLEEP APNEA, UNSPECIFIED TYPE: ICD-10-CM

## 2023-10-03 DIAGNOSIS — G47.33 OBSTRUCTIVE SLEEP APNEA SYNDROME: ICD-10-CM

## 2023-10-03 DIAGNOSIS — F90.0 ATTENTION DEFICIT HYPERACTIVITY DISORDER (ADHD), PREDOMINANTLY INATTENTIVE TYPE: ICD-10-CM

## 2023-10-03 DIAGNOSIS — K21.9 GASTROESOPHAGEAL REFLUX DISEASE WITHOUT ESOPHAGITIS: ICD-10-CM

## 2023-10-03 LAB
ALBUMIN SERPL-MCNC: 3.7 G/DL (ref 3.5–5)
ALBUMIN/GLOB SERPL: 1.4 RATIO (ref 1.1–2)
ALP SERPL-CCNC: 52 UNIT/L (ref 40–150)
ALT SERPL-CCNC: 33 UNIT/L (ref 0–55)
APPEARANCE UR: CLEAR
AST SERPL-CCNC: 23 UNIT/L (ref 5–34)
BACTERIA #/AREA URNS AUTO: NORMAL /HPF
BASOPHILS # BLD AUTO: 0.06 X10(3)/MCL
BASOPHILS NFR BLD AUTO: 1.4 %
BILIRUB SERPL-MCNC: 0.5 MG/DL
BILIRUB UR QL STRIP.AUTO: NEGATIVE
BUN SERPL-MCNC: 15.5 MG/DL (ref 8.4–25.7)
C3 SERPL-MCNC: 158 MG/DL (ref 80–173)
C4 SERPL-MCNC: 32 MG/DL (ref 13–46)
CALCIUM SERPL-MCNC: 9 MG/DL (ref 8.4–10.2)
CHLORIDE SERPL-SCNC: 107 MMOL/L (ref 98–107)
CO2 SERPL-SCNC: 26 MMOL/L (ref 22–29)
COLOR UR AUTO: NORMAL
CREAT SERPL-MCNC: 1.44 MG/DL (ref 0.73–1.18)
CREAT UR-MCNC: 208.9 MG/DL (ref 63–166)
CRP SERPL HS-MCNC: 1.63 MG/L
DEPRECATED CALCIDIOL+CALCIFEROL SERPL-MC: 25.7 NG/ML (ref 30–80)
EOSINOPHIL # BLD AUTO: 0.19 X10(3)/MCL (ref 0–0.9)
EOSINOPHIL NFR BLD AUTO: 4.4 %
ERYTHROCYTE [DISTWIDTH] IN BLOOD BY AUTOMATED COUNT: 16.1 % (ref 11.5–17)
GFR SERPLBLD CREATININE-BSD FMLA CKD-EPI: 58 MLS/MIN/1.73/M2
GLOBULIN SER-MCNC: 2.7 GM/DL (ref 2.4–3.5)
GLUCOSE SERPL-MCNC: 107 MG/DL (ref 74–100)
GLUCOSE UR QL STRIP.AUTO: NEGATIVE
HCT VFR BLD AUTO: 45.8 % (ref 42–52)
HGB BLD-MCNC: 14.7 G/DL (ref 14–18)
IMM GRANULOCYTES # BLD AUTO: 0.02 X10(3)/MCL (ref 0–0.04)
IMM GRANULOCYTES NFR BLD AUTO: 0.5 %
KETONES UR QL STRIP.AUTO: NEGATIVE
LEUKOCYTE ESTERASE UR QL STRIP.AUTO: NEGATIVE
LYMPHOCYTES # BLD AUTO: 1.28 X10(3)/MCL (ref 0.6–4.6)
LYMPHOCYTES NFR BLD AUTO: 29.4 %
MCH RBC QN AUTO: 27.1 PG (ref 27–31)
MCHC RBC AUTO-ENTMCNC: 32.1 G/DL (ref 33–36)
MCV RBC AUTO: 84.5 FL (ref 80–94)
MONOCYTES # BLD AUTO: 0.31 X10(3)/MCL (ref 0.1–1.3)
MONOCYTES NFR BLD AUTO: 7.1 %
NEUTROPHILS # BLD AUTO: 2.49 X10(3)/MCL (ref 2.1–9.2)
NEUTROPHILS NFR BLD AUTO: 57.2 %
NITRITE UR QL STRIP.AUTO: NEGATIVE
NRBC BLD AUTO-RTO: 0 %
PH UR STRIP.AUTO: 6 [PH]
PLATELET # BLD AUTO: 213 X10(3)/MCL (ref 130–400)
PMV BLD AUTO: 9.2 FL (ref 7.4–10.4)
POTASSIUM SERPL-SCNC: 4.3 MMOL/L (ref 3.5–5.1)
PROT SERPL-MCNC: 6.4 GM/DL (ref 6.4–8.3)
PROT UR QL STRIP.AUTO: NEGATIVE
PROT UR STRIP-MCNC: 15 MG/DL
RBC # BLD AUTO: 5.42 X10(6)/MCL (ref 4.7–6.1)
RBC #/AREA URNS AUTO: <5 /HPF
RBC UR QL AUTO: NEGATIVE
SODIUM SERPL-SCNC: 138 MMOL/L (ref 136–145)
SP GR UR STRIP.AUTO: 1.02 (ref 1–1.03)
SQUAMOUS #/AREA URNS AUTO: <5 /HPF
T3FREE SERPL-MCNC: 3.23 PG/ML (ref 1.58–3.91)
T4 FREE SERPL-MCNC: 0.83 NG/DL (ref 0.7–1.48)
TSH SERPL-ACNC: 2.18 UIU/ML (ref 0.35–4.94)
URATE SERPL-MCNC: 4.9 MG/DL (ref 3.5–7.2)
URINE PROTEIN/CREATININE RATIO (OHS): 0.1
UROBILINOGEN UR STRIP-ACNC: 0.2
WBC # SPEC AUTO: 4.35 X10(3)/MCL (ref 4.5–11.5)
WBC #/AREA URNS AUTO: <5 /HPF

## 2023-10-03 PROCEDURE — 86160 COMPLEMENT ANTIGEN: CPT

## 2023-10-03 PROCEDURE — 84550 ASSAY OF BLOOD/URIC ACID: CPT

## 2023-10-03 PROCEDURE — 36415 COLL VENOUS BLD VENIPUNCTURE: CPT

## 2023-10-03 PROCEDURE — 82570 ASSAY OF URINE CREATININE: CPT

## 2023-10-03 PROCEDURE — 82306 VITAMIN D 25 HYDROXY: CPT

## 2023-10-03 PROCEDURE — 84443 ASSAY THYROID STIM HORMONE: CPT

## 2023-10-03 PROCEDURE — 81001 URINALYSIS AUTO W/SCOPE: CPT

## 2023-10-03 PROCEDURE — 80053 COMPREHEN METABOLIC PANEL: CPT

## 2023-10-03 PROCEDURE — 86039 ANTINUCLEAR ANTIBODIES (ANA): CPT

## 2023-10-03 PROCEDURE — 86141 C-REACTIVE PROTEIN HS: CPT

## 2023-10-03 PROCEDURE — 84481 FREE ASSAY (FT-3): CPT

## 2023-10-03 PROCEDURE — 85025 COMPLETE CBC W/AUTO DIFF WBC: CPT

## 2023-10-03 PROCEDURE — 86225 DNA ANTIBODY NATIVE: CPT

## 2023-10-03 PROCEDURE — 87340 HEPATITIS B SURFACE AG IA: CPT

## 2023-10-03 PROCEDURE — 84439 ASSAY OF FREE THYROXINE: CPT

## 2023-10-03 PROCEDURE — 86803 HEPATITIS C AB TEST: CPT

## 2023-10-04 LAB
ANA SER QL HEP2 SUBST: NORMAL
DSDNA AB QUANT (OHS): 0.7 IU/ML
HBV SURFACE AG SERPL QL IA: NONREACTIVE
HCV AB SERPL QL IA: NONREACTIVE

## 2024-05-26 PROBLEM — G47.33 OBSTRUCTIVE SLEEP APNEA SYNDROME: Status: ACTIVE | Noted: 2022-08-12

## 2024-05-26 NOTE — PROGRESS NOTES
..Annual Exam (Did lab work with cardiologist )       HPI:       Patient presents for wellness examination.    He has been feeling well.    He has been sleeping well. He uses his C-PAP daily.  His appetite is too good.   He is ; he has 2 children ages 24 and 20.    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  He drinks a 12 pack a month.    He smokes intermittently. He smokes when he plays golf; 2 times a month.  He climbs a lot of stairs at work, otherwise, he is not active at work.  He has a rowing machine he does daily.   Rheumatologist:  Dr. Jennings; has appointment 6/04/2024.   Cardio: Dr Diop; last office visit: December 2022. Has an appointment with him next month.   Last colonoscopy/EGD 6/27/2023: Dr Pak; EGD normal.  Colonoscopy with polyp x1, follow-up in 5 years.    The patient's Health Maintenance was reviewed and the following appears to be due at this time:   Health Maintenance Due   Topic Date Due    Pneumococcal Vaccines (Age 0-64) (1 of 2 - PCV) Never done    HIV Screening  Never done    COVID-19 Vaccine (3 - Pfizer risk series) 08/28/2021       ..  Past Medical History:   Diagnosis Date    Anxiety     Depression     GERD (gastroesophageal reflux disease)     Psoriasis     Psoriatic arthritis           ..  Past Surgical History:   Procedure Laterality Date    NASAL SINUS SURGERY  09/23/2020    nissen funditlication      TONSILLECTOMY            Current Outpatient Medications   Medication Instructions    aspirin (ECOTRIN) 81 mg, Oral, Daily    clobetasoL (TEMOVATE) 0.05 % cream Topical (Top), Nightly    cyclobenzaprine (FLEXERIL) 20 mg, Oral, Nightly    fluticasone propionate (FLONASE) 50 mcg/actuation nasal spray 1 spray, Each Nostril, Daily    leflunomide (ARAVA) 20 mg, Oral, Daily    omeprazole (PRILOSEC) 40 MG capsule Take 1 capsule by mouth twice daily.    rosuvastatin (CRESTOR) 10 mg, Oral, Daily    sulfaSALAzine (AZULFIDINE) 1,000 mg, Oral, With dinner    terbinafine HCL (LAMISIL) 250 mg, Oral,  Daily         ..  Social History     Socioeconomic History    Marital status:     Number of children: 2   Occupational History    Occupation:    Tobacco Use    Smoking status: Former     Types: Cigarettes     Passive exposure: Never    Smokeless tobacco: Never    Tobacco comments:     Smokes a couple of cigarettes when he plays golf   Substance and Sexual Activity    Alcohol use: Yes     Alcohol/week: 1.0 standard drink of alcohol     Types: 1 Cans of beer per week     Comment: 1-2 times per month    Drug use: Never    Sexual activity: Not Currently     Partners: Female     Social Determinants of Health     Financial Resource Strain: Low Risk  (5/29/2024)    Overall Financial Resource Strain (CARDIA)     Difficulty of Paying Living Expenses: Not hard at all   Food Insecurity: No Food Insecurity (5/29/2024)    Hunger Vital Sign     Worried About Running Out of Food in the Last Year: Never true     Ran Out of Food in the Last Year: Never true   Transportation Needs: No Transportation Needs (5/29/2024)    TRANSPORTATION NEEDS     Transportation : No   Physical Activity: Sufficiently Active (5/29/2024)    Exercise Vital Sign     Days of Exercise per Week: 5 days     Minutes of Exercise per Session: 30 min   Stress: No Stress Concern Present (5/29/2024)    [employer] - Occupational Stress Questionnaire     Feeling of Stress : Not at all   Housing Stability: Low Risk  (5/29/2024)    Housing Stability Vital Sign     Unable to Pay for Housing in the Last Year: No     Homeless in the Last Year: No          ..  Family History   Problem Relation Name Age of Onset    Alzheimer's disease Mother      Prostate cancer Father Kyle Plaza     Hypertension Father Kyle Plaza     Heart disease Father Kyle Plaza           ..Review of patient's allergies indicates:  No Known Allergies       ..  Immunization History   Administered Date(s) Administered    COVID-19, MRNA, LN-S, PF (Pfizer)  "(Purple Cap) 07/10/2021, 07/31/2021    Influenza 09/22/2010    Influenza - Quadrivalent - PF *Preferred* (6 months and older) 10/10/2011    Influenza - Trivalent - PF (ADULT) 10/10/2011, 09/11/2013, 10/03/2014    Tdap 01/21/2022    Zoster Recombinant 01/23/2023, 07/05/2023          REVIEW OF SYSTEMS:    GENERAL: No weight loss, no weight gain, no fever, no fatigue, no chills, no night sweats  HEENT: No sore throat, no ear pain, no sinus pressure, no nasal congestion, no rhinorrhea, no decreased hearing, no snoring  VISION: No vision changes, no blurry vision, no double vision, no glaucoma, no cataracts, + reading glasses  LAST EYE EXAM: earlier this year  NECK: no LAD  CARDIAC: No chest pain, no palpitations, no dyspnea on exertion, no orthopnea  RESPIRATORY: No cough, no wheezing, no sputum production, no SOB  GI: No abdominal pain, no N/V, + GERD, no heartburn,  no constipation, no diarrhea, no blood in stool, (- ) family history of Colon Ca  : No dysuria, no hematuria, no frequency, no urgency, no incontinence, no testicular pain/swelling, (+ ) family history of Prostate Ca: father  MUSC/SKEL: No myalgia, no weakness, no edema, no arthralgia, no joint swelling/effusions, + psoriatic arthritis  SKIN: No rashes, no hives, no itching, no sores, + psoriasis  NEURO: No HA, no numbness, no tingling, no weakness, no dizziness  PSYCH: No anxiety, no depression, no irritability, no panic attacks, no s/i, no h/i, no hallucinations  ENDO: No polyuria, no polyphagia, no polydipsia  HEME: No bruising, no bleeding disorders, no signs of anemia.       PHYSICAL EXAM:    ..Visit Vitals  BP (!) 142/82   Pulse 100   Temp 98 °F (36.7 °C)   Ht 5' 9" (1.753 m)   Wt 109.2 kg (240 lb 11.2 oz)   SpO2 98%   BMI 35.55 kg/m²        General: Well developed, well nourished white male in no apparent distress, alert and oriented x3  Skin: No rash or abnormal lesions  HEENT: Normocephalic, PERRLA, EOMI, mouth WNL, throat WNL, nares normal, EAC " and TM WNL bilaterally  Neck: FROM, no LAD, no thyroid abnormalities palpable  Chest: CTA bilaterally, no wheezes crackles or rubs  Cardiac: RRR, no murmurs, rubs, gallops  Abdomen: Soft, nontender, nondistended, NBSx4, no rebound tenderness or guarding, no HSM  Extremities: No clubbing, cyanosis, or edema. Joints WNL, +2 DP/PT pulses bilaterally  Neuro: No sensory or motor defects noted. CN II-XII intact. Gait WNL.  Genital:  Testes descended, soft with mild atrophy, no hernias  Rectal: no hemorrhoids or fissures, prostate soft/smooth without enlargement       1. Encounter for wellness examination in adult  Overview:  Patient presents for wellness examination.    He has been feeling well.    He reports persistent belching; he would like to be referred for an EGD.  He is due for a follow-up colonoscopy; previous GI doctor retired.  Will refer to Dr. Mirza.  Rheum: Dr Jennings; psoriatic arthritis, doing well on leflunomide.   Cardio:  Dr. Diop; last office visit December 2022; obtain note/labs.  Patient encouraged to increase physical activity, exercise and lose weight.    Patient encouraged to make healthy food choices.  Patient encouraged to limit intake of fried foods, processed foods and sugary foods.  Shingrix vaccine administered; patient to return in 2-6 months for 2nd vaccine.  No labs performed today; obtain recent labs from Cardiology and possibly Rheumatology.    Orders:  -     CBC Auto Differential; Future; Expected date: 05/29/2024  -     Comprehensive Metabolic Panel; Future; Expected date: 05/29/2024  -     Urinalysis; Future; Expected date: 05/29/2024    2. Hypercholesteremia  Overview:  Patient has been compliant with rosuvastatin; refills sent.    Continue low-cholesterol/low-fat diet.    Lipid profile pending.      3. Renal insufficiency  Overview:  Will obtain last labs from [facility].    07/05/2023: Creatinine in April was 1.44.  His previous creatinines have been in the upper 1.2's  and1.3's.   Patient advised to drink plenty of water.    Patient advised to avoid NSAIDs.  Will recheck creatinine before end of year.    05/29/2024:  Last creatinine 10/2023 was 1.44.  Patient advised to drink plenty of water.  Patient advised to avoid NSAIDs.  Labs pending.    Orders:  -     omeprazole (PRILOSEC) 40 MG capsule; Take 1 capsule by mouth twice daily.  Dispense: 60 capsule; Refill: 11  -     CBC Auto Differential; Future; Expected date: 05/29/2024  -     Comprehensive Metabolic Panel; Future; Expected date: 05/29/2024  -     Lipid Panel; Future; Expected date: 05/29/2024  -     Urinalysis; Future; Expected date: 05/29/2024  -     Microalbumin/Creatinine Ratio, Urine; Future; Expected date: 05/29/2024  -     Magnesium; Future; Expected date: 05/29/2024  -     Phosphorus; Future; Expected date: 05/29/2024  -     Vitamin D; Future; Expected date: 05/29/2024  -     PTH, Intact; Future; Expected date: 05/29/2024    4. Stage 3a chronic kidney disease  Overview:  Will obtain last labs from [facility].    07/05/2023: Creatinine in April was 1.44.  His previous creatinines have been in the upper 1.2's and1.3's.   Patient advised to drink plenty of water.    Patient advised to avoid NSAIDs.  Will recheck creatinine before end of year.    05/29/2024:  Last creatinine 10/2023 was 1.44.  Patient advised to drink plenty of water.  Patient advised to avoid NSAIDs.  Labs pending.      5. Psoriasis  Overview:  Stable; followed by Dr. Jennings.    05/29/2024: Stable; followed by Dr. Jennings.   Patient is on leflunomide and sulfasalazine.  Patient has appointment with him next month.    Orders:  -     omeprazole (PRILOSEC) 40 MG capsule; Take 1 capsule by mouth twice daily.  Dispense: 60 capsule; Refill: 11    6. Psoriatic arthritis  Overview:  MARIBETH neg, dsDNA neg, RF Neg.   Stable on Leflunomide     05/29/2024:  Stable; followed by Dr. Jennings.   Patient is on leflunomide and sulfasalazine.  Patient has appointment  with him next month.    Orders:  -     omeprazole (PRILOSEC) 40 MG capsule; Take 1 capsule by mouth twice daily.  Dispense: 60 capsule; Refill: 11  -     CBC Auto Differential; Future; Expected date: 05/29/2024  -     Comprehensive Metabolic Panel; Future; Expected date: 05/29/2024    7. Screening PSA (prostate specific antigen)  Overview:  Patient denies any obstructive or irritative voiding symptoms.    His father had prostate cancer.    His prostate exam today is benign.    PSA pending.    05/29/2024:  Patient denies any obstructive or irritative voiding symptoms.    His father had prostate cancer.    His prostate exam today is benign.    PSA pending.    Orders:  -     PSA, Screening; Future; Expected date: 05/29/2024    8. Drug-induced immunodeficiency  Overview:  Patient is on leflunomide for rheumatoid arthritis; followed by Dr. Jennings.    Patient is on leflunomide and sulfasalazine for rheumatoid arthritis; followed by Dr. Jennings.      9. Obstructive sleep apnea syndrome  Overview:  Patient has been compliant with and doing well on CPAP therapy.  It would be medically prudent for patient to continue CPAP.    05/29/2024: Patient is compliant with and doing well on CPAP therapy.    Orders:  -     omeprazole (PRILOSEC) 40 MG capsule; Take 1 capsule by mouth twice daily.  Dispense: 60 capsule; Refill: 11    10. Onychomycosis  Overview:  Patient with chronic onychomycosis bilateral great toe nails.  Treatment options discussed with patient.    Start Lamisil 250 mg daily; potential side effects discussed with patient.  Potential for hepatotoxicity discussed with patient.      11. Gastroesophageal reflux disease, unspecified whether esophagitis present  Overview:  He denies heartburn but he has persistent belching.  Patient does not wish to change his medication at this time.  Continue omeprazole.  GI referral for EGD.    05/29/2024:  EGD 06/27/2023 unremarkable.  His reflux is well controlled on omeprazole; refills  sent.      12. Severe obesity (BMI 35.0-39.9) with comorbidity  Overview:  Patient encouraged to make healthy food choices and limit portions.    Patient encouraged to limit intake of fried foods, processed foods and sugary foods.    Patient encouraged to increase physical activity, exercise and lose weight.      13. Immunization due  Overview:  Shingrix 0.5 IM administered; benefits/potential risks/side effects discussed with patient.    Patient advised to return in 2-6 months for 2nd Shingrix vaccine.  Patient expressed understanding.    07/05/2023:  Patient is due for 2nd Shingrix vaccine; vaccine administered.    05/29/2024: Patient advised to get a Prevnar vaccine.      Other orders  -     rosuvastatin (CRESTOR) 10 MG tablet; Take 1 tablet (10 mg total) by mouth once daily.  Dispense: 30 tablet; Refill: 11  -     terbinafine HCL (LAMISIL) 250 mg tablet; Take 1 tablet (250 mg total) by mouth once daily.  Dispense: 30 tablet; Refill: 2         ..Follow up in about 6 months (around 11/29/2024) for Follow Up.     Future Appointments   Date Time Provider Department Center   11/29/2024  8:30 AM Hadley Guzmán MD [facility]    5/29/2025  8:30 AM Hadley Guzmán MD [facility]

## 2024-05-29 ENCOUNTER — OFFICE VISIT (OUTPATIENT)
Dept: PRIMARY CARE CLINIC | Facility: CLINIC | Age: 54
End: 2024-05-29
Payer: COMMERCIAL

## 2024-05-29 VITALS
HEART RATE: 100 BPM | WEIGHT: 240.69 LBS | SYSTOLIC BLOOD PRESSURE: 142 MMHG | DIASTOLIC BLOOD PRESSURE: 82 MMHG | HEIGHT: 69 IN | OXYGEN SATURATION: 98 % | TEMPERATURE: 98 F | BODY MASS INDEX: 35.65 KG/M2

## 2024-05-29 DIAGNOSIS — Z12.5 SCREENING PSA (PROSTATE SPECIFIC ANTIGEN): ICD-10-CM

## 2024-05-29 DIAGNOSIS — E66.01 SEVERE OBESITY (BMI 35.0-39.9) WITH COMORBIDITY: ICD-10-CM

## 2024-05-29 DIAGNOSIS — L40.50 PSORIATIC ARTHRITIS: ICD-10-CM

## 2024-05-29 DIAGNOSIS — Z79.899 DRUG-INDUCED IMMUNODEFICIENCY: ICD-10-CM

## 2024-05-29 DIAGNOSIS — Z23 IMMUNIZATION DUE: ICD-10-CM

## 2024-05-29 DIAGNOSIS — Z00.00 ENCOUNTER FOR WELLNESS EXAMINATION IN ADULT: Primary | ICD-10-CM

## 2024-05-29 DIAGNOSIS — E78.00 HYPERCHOLESTEREMIA: ICD-10-CM

## 2024-05-29 DIAGNOSIS — K21.9 GASTROESOPHAGEAL REFLUX DISEASE, UNSPECIFIED WHETHER ESOPHAGITIS PRESENT: ICD-10-CM

## 2024-05-29 DIAGNOSIS — N28.9 RENAL INSUFFICIENCY: ICD-10-CM

## 2024-05-29 DIAGNOSIS — L40.9 PSORIASIS: ICD-10-CM

## 2024-05-29 DIAGNOSIS — D84.821 DRUG-INDUCED IMMUNODEFICIENCY: ICD-10-CM

## 2024-05-29 DIAGNOSIS — N18.31 STAGE 3A CHRONIC KIDNEY DISEASE: ICD-10-CM

## 2024-05-29 DIAGNOSIS — B35.1 ONYCHOMYCOSIS: ICD-10-CM

## 2024-05-29 DIAGNOSIS — G47.33 OBSTRUCTIVE SLEEP APNEA SYNDROME: ICD-10-CM

## 2024-05-29 PROCEDURE — 3079F DIAST BP 80-89 MM HG: CPT | Mod: CPTII,,, | Performed by: FAMILY MEDICINE

## 2024-05-29 PROCEDURE — 3008F BODY MASS INDEX DOCD: CPT | Mod: CPTII,,, | Performed by: FAMILY MEDICINE

## 2024-05-29 PROCEDURE — 1159F MED LIST DOCD IN RCRD: CPT | Mod: CPTII,,, | Performed by: FAMILY MEDICINE

## 2024-05-29 PROCEDURE — 1160F RVW MEDS BY RX/DR IN RCRD: CPT | Mod: CPTII,,, | Performed by: FAMILY MEDICINE

## 2024-05-29 PROCEDURE — 3077F SYST BP >= 140 MM HG: CPT | Mod: CPTII,,, | Performed by: FAMILY MEDICINE

## 2024-05-29 PROCEDURE — 99396 PREV VISIT EST AGE 40-64: CPT | Mod: ,,, | Performed by: FAMILY MEDICINE

## 2024-05-29 RX ORDER — OMEPRAZOLE 40 MG/1
CAPSULE, DELAYED RELEASE ORAL
Qty: 60 CAPSULE | Refills: 11 | Status: SHIPPED | OUTPATIENT
Start: 2024-05-29

## 2024-05-29 RX ORDER — ROSUVASTATIN CALCIUM 10 MG/1
10 TABLET, COATED ORAL DAILY
Qty: 30 TABLET | Refills: 11 | Status: SHIPPED | OUTPATIENT
Start: 2024-05-29 | End: 2025-05-24

## 2024-05-29 RX ORDER — TERBINAFINE HYDROCHLORIDE 250 MG/1
250 TABLET ORAL DAILY
Qty: 30 TABLET | Refills: 2 | Status: SHIPPED | OUTPATIENT
Start: 2024-05-29 | End: 2024-08-27

## 2024-05-31 ENCOUNTER — LAB VISIT (OUTPATIENT)
Dept: LAB | Facility: HOSPITAL | Age: 54
End: 2024-05-31
Attending: FAMILY MEDICINE
Payer: COMMERCIAL

## 2024-05-31 DIAGNOSIS — Z12.5 SCREENING PSA (PROSTATE SPECIFIC ANTIGEN): ICD-10-CM

## 2024-05-31 DIAGNOSIS — Z00.00 ENCOUNTER FOR WELLNESS EXAMINATION IN ADULT: ICD-10-CM

## 2024-05-31 DIAGNOSIS — L40.50 PSORIATIC ARTHRITIS: ICD-10-CM

## 2024-05-31 DIAGNOSIS — N28.9 RENAL INSUFFICIENCY: ICD-10-CM

## 2024-05-31 LAB
25(OH)D3+25(OH)D2 SERPL-MCNC: 29 NG/ML (ref 30–80)
ALBUMIN SERPL-MCNC: 3.8 G/DL (ref 3.5–5)
ALBUMIN/GLOB SERPL: 1.4 RATIO (ref 1.1–2)
ALP SERPL-CCNC: 56 UNIT/L (ref 40–150)
ALT SERPL-CCNC: 51 UNIT/L (ref 0–55)
ANION GAP SERPL CALC-SCNC: 7 MEQ/L
AST SERPL-CCNC: 32 UNIT/L (ref 5–34)
BASOPHILS # BLD AUTO: 0.05 X10(3)/MCL
BASOPHILS NFR BLD AUTO: 1.1 %
BILIRUB SERPL-MCNC: 0.5 MG/DL
BUN SERPL-MCNC: 14.6 MG/DL (ref 8.4–25.7)
CALCIUM SERPL-MCNC: 9.1 MG/DL (ref 8.4–10.2)
CHLORIDE SERPL-SCNC: 107 MMOL/L (ref 98–107)
CHOLEST SERPL-MCNC: 170 MG/DL
CHOLEST/HDLC SERPL: 3 {RATIO} (ref 0–5)
CO2 SERPL-SCNC: 24 MMOL/L (ref 22–29)
CREAT SERPL-MCNC: 1.16 MG/DL (ref 0.73–1.18)
CREAT UR-MCNC: 103.8 MG/DL (ref 63–166)
CREAT/UREA NIT SERPL: 13
EOSINOPHIL # BLD AUTO: 0.16 X10(3)/MCL (ref 0–0.9)
EOSINOPHIL NFR BLD AUTO: 3.5 %
ERYTHROCYTE [DISTWIDTH] IN BLOOD BY AUTOMATED COUNT: 14.8 % (ref 11.5–17)
GFR SERPLBLD CREATININE-BSD FMLA CKD-EPI: >60 ML/MIN/1.73/M2
GLOBULIN SER-MCNC: 2.8 GM/DL (ref 2.4–3.5)
GLUCOSE SERPL-MCNC: 101 MG/DL (ref 74–100)
HCT VFR BLD AUTO: 43.9 % (ref 42–52)
HDLC SERPL-MCNC: 51 MG/DL (ref 35–60)
HGB BLD-MCNC: 14.1 G/DL (ref 14–18)
IMM GRANULOCYTES # BLD AUTO: 0.02 X10(3)/MCL (ref 0–0.04)
IMM GRANULOCYTES NFR BLD AUTO: 0.4 %
LDLC SERPL CALC-MCNC: 97 MG/DL (ref 50–140)
LYMPHOCYTES # BLD AUTO: 1.18 X10(3)/MCL (ref 0.6–4.6)
LYMPHOCYTES NFR BLD AUTO: 25.6 %
MAGNESIUM SERPL-MCNC: 1.9 MG/DL (ref 1.6–2.6)
MCH RBC QN AUTO: 28.5 PG (ref 27–31)
MCHC RBC AUTO-ENTMCNC: 32.1 G/DL (ref 33–36)
MCV RBC AUTO: 88.7 FL (ref 80–94)
MICROALBUMIN UR-MCNC: <5 UG/ML
MICROALBUMIN/CREAT RATIO PNL UR: NORMAL
MONOCYTES # BLD AUTO: 0.32 X10(3)/MCL (ref 0.1–1.3)
MONOCYTES NFR BLD AUTO: 6.9 %
NEUTROPHILS # BLD AUTO: 2.88 X10(3)/MCL (ref 2.1–9.2)
NEUTROPHILS NFR BLD AUTO: 62.5 %
NRBC BLD AUTO-RTO: 0 %
PHOSPHATE SERPL-MCNC: 1.9 MG/DL (ref 2.3–4.7)
PLATELET # BLD AUTO: 225 X10(3)/MCL (ref 130–400)
PMV BLD AUTO: 9.4 FL (ref 7.4–10.4)
POTASSIUM SERPL-SCNC: 4.3 MMOL/L (ref 3.5–5.1)
PROT SERPL-MCNC: 6.6 GM/DL (ref 6.4–8.3)
PSA SERPL-MCNC: 1.24 NG/ML
PTH-INTACT SERPL-MCNC: 106.4 PG/ML (ref 8.7–77)
RBC # BLD AUTO: 4.95 X10(6)/MCL (ref 4.7–6.1)
SODIUM SERPL-SCNC: 138 MMOL/L (ref 136–145)
TRIGL SERPL-MCNC: 109 MG/DL (ref 34–140)
VLDLC SERPL CALC-MCNC: 22 MG/DL
WBC # SPEC AUTO: 4.61 X10(3)/MCL (ref 4.5–11.5)

## 2024-05-31 PROCEDURE — 82306 VITAMIN D 25 HYDROXY: CPT

## 2024-05-31 PROCEDURE — 84153 ASSAY OF PSA TOTAL: CPT

## 2024-05-31 PROCEDURE — 83970 ASSAY OF PARATHORMONE: CPT

## 2024-05-31 PROCEDURE — 80061 LIPID PANEL: CPT

## 2024-05-31 PROCEDURE — 84100 ASSAY OF PHOSPHORUS: CPT

## 2024-05-31 PROCEDURE — 85025 COMPLETE CBC W/AUTO DIFF WBC: CPT

## 2024-05-31 PROCEDURE — 80053 COMPREHEN METABOLIC PANEL: CPT

## 2024-05-31 PROCEDURE — 36415 COLL VENOUS BLD VENIPUNCTURE: CPT

## 2024-05-31 PROCEDURE — 83735 ASSAY OF MAGNESIUM: CPT

## 2024-05-31 PROCEDURE — 82043 UR ALBUMIN QUANTITATIVE: CPT

## 2024-06-03 DIAGNOSIS — R79.89 INCREASED PTH LEVEL: ICD-10-CM

## 2024-06-03 DIAGNOSIS — N18.31 STAGE 3A CHRONIC KIDNEY DISEASE: Primary | ICD-10-CM

## 2024-07-01 DIAGNOSIS — L40.9 PSORIASIS: Primary | ICD-10-CM

## 2024-07-01 RX ORDER — TERBINAFINE HYDROCHLORIDE 250 MG/1
250 TABLET ORAL
Qty: 30 TABLET | Refills: 2 | Status: SHIPPED | OUTPATIENT
Start: 2024-07-01

## 2024-07-16 ENCOUNTER — DOCUMENTATION ONLY (OUTPATIENT)
Dept: RHEUMATOLOGY | Facility: CLINIC | Age: 54
End: 2024-07-16
Payer: COMMERCIAL

## 2024-08-26 PROBLEM — Z00.00 ENCOUNTER FOR WELLNESS EXAMINATION IN ADULT: Status: RESOLVED | Noted: 2023-01-22 | Resolved: 2024-08-26

## 2024-10-02 DIAGNOSIS — L40.9 PSORIASIS: ICD-10-CM

## 2024-10-02 RX ORDER — TERBINAFINE HYDROCHLORIDE 250 MG/1
250 TABLET ORAL
Qty: 30 TABLET | Refills: 2 | Status: SHIPPED | OUTPATIENT
Start: 2024-10-02

## 2024-11-13 ENCOUNTER — PATIENT MESSAGE (OUTPATIENT)
Dept: RESEARCH | Facility: HOSPITAL | Age: 54
End: 2024-11-13
Payer: COMMERCIAL

## 2024-12-02 ENCOUNTER — TELEPHONE (OUTPATIENT)
Dept: PRIMARY CARE CLINIC | Facility: CLINIC | Age: 54
End: 2024-12-02
Payer: COMMERCIAL

## 2024-12-02 NOTE — TELEPHONE ENCOUNTER
----- Message from Bharti sent at 11/26/2024  1:06 PM CST -----  Who Called: Scott Plaza    Pharmacy is calling to request assistance with Rx    Pharmacy name and phone number:  [facility] [phone]  ext [phone] or fax [phone]  Pharmacy contact: travis  Patient Name: scott  Prescription Name: cpap supplies   What do they need to clarify?:refill request        Preferred Method of Contact: Phone Call  Patient's Preferred Phone Number on File: [phone]   Best Call Back Number, if different:  Additional Information: pharmacy call, please advise, thanks

## 2024-12-30 DIAGNOSIS — L40.9 PSORIASIS: ICD-10-CM

## 2024-12-30 RX ORDER — TERBINAFINE HYDROCHLORIDE 250 MG/1
250 TABLET ORAL
Qty: 30 TABLET | Refills: 2 | Status: SHIPPED | OUTPATIENT
Start: 2024-12-30

## 2025-01-22 NOTE — PROGRESS NOTES
"Follow-up (6 month follow up )       HPI:    Patient presents for 6 month recheck.    He has been feeling well.  He has been sleeping well. He has been compliant with and doing well on C-Pap machine.  His appetite has been too good; he has lost 9 # since 5/29/2024.     His reflux has been doing well.  He has a rowing machine at home.  His last office visit with Dr Jennings was in October.  His great toe nails have lightened up but nails are still thickened.  His last Cr was 1.16 on 5/29/2024.       Current Outpatient Medications   Medication Instructions    aspirin (ECOTRIN) 81 mg, Daily    cyclobenzaprine (FLEXERIL) 20 mg, Oral, Nightly    fluticasone propionate (FLONASE) 50 mcg/actuation nasal spray 1 spray, Daily    leflunomide (ARAVA) 20 mg, Oral, Daily    omeprazole (PRILOSEC) 40 MG capsule Take 1 capsule by mouth twice daily.    rosuvastatin (CRESTOR) 10 mg, Oral, Daily    sulfaSALAzine (AZULFIDINE) 1,000 mg, 2 times daily    terbinafine HCL (LAMISIL) 250 mg, Oral    triamcinolone acetonide 0.1% (KENALOG) 0.1 % ointment 2 times daily         ROS:    His psoriasis has been controlled with medications.      PE:    ..Visit Vitals  /87   Pulse 104   Temp 98.3 °F (36.8 °C)   Ht 5' 9" (1.753 m)   Wt 104.8 kg (231 lb)   SpO2 97%   BMI 34.11 kg/m²        General: He is a well-developed well-nourished obese white male in no apparent distress.  He is alert and oriented.  Chest: Clear to auscultation bilaterally.    CV: Regular rate rhythm without murmurs rubs or gallops.    Bilateral lower extremities: Without edema.        1. Stage 3a chronic kidney disease  Overview:  Will obtain last labs from [facility].    07/05/2023: Creatinine in April was 1.44.  His previous creatinines have been in the upper 1.2's and1.3's.   Patient advised to drink plenty of water.    Patient advised to avoid NSAIDs.  Will recheck creatinine before end of year.    05/29/2024:  Last creatinine 10/2023 was 1.44.  Patient advised to " drink plenty of water.  Patient advised to avoid NSAIDs.  Labs pending.    Assessment & Plan:  His last creatinine was 1.16 in May 2024.  Recheck values at upcoming wellness examination in May.    Patient advised to drink plenty of water.  Patient advised to avoid NSAIDs.      2. Gastroesophageal reflux disease, unspecified whether esophagitis present  Overview:  He denies heartburn but he has persistent belching.  Patient does not wish to change his medication at this time.  Continue omeprazole.  GI referral for EGD.    05/29/2024:  EGD 06/27/2023 unremarkable.  His reflux is well controlled on omeprazole; refills sent.    Assessment & Plan:  His reflux has been well controlled on omeprazole.      3. Psoriasis  Overview:  Stable; followed by Dr. Jennings.    05/29/2024: Stable; followed by Dr. Jennings.   Patient is on leflunomide and sulfasalazine.  Patient has appointment with him next month.    Assessment & Plan:  Stable; followed by Dr. Jennings.      4. Drug-induced immunodeficiency  Overview:  Patient is on leflunomide for rheumatoid arthritis; followed by Dr. Jennings.    Patient is on leflunomide and sulfasalazine for rheumatoid arthritis; followed by Dr. Jennings.    Assessment & Plan:  Stable; patient is on leflunomide and sulfasalazine for rheumatoid arthritis; followed by Dr. Jennings.       5. Class 1 obesity due to excess calories with serious comorbidity and body mass index (BMI) of 34.0 to 34.9 in adult  Overview:  Patient encouraged to make healthy food choices and limit portions.    Patient encouraged to limit intake of fried foods, processed foods and sugary foods.    Patient encouraged to increase physical activity, exercise and lose weight.    Assessment & Plan:  Patient has lost 9 lb since May 2024  Patient encouraged to make healthy food choices and limit portions.    Patient encouraged to limit intake of fried foods, processed foods and sugary foods.    Patient encouraged to lose weight.       6.  Hypercholesteremia  Overview:  Patient has been compliant with rosuvastatin; refills sent.    Continue low-cholesterol/low-fat diet.    Lipid profile pending.  Lipid profile:  Total cholesterol 170, HDL 51, triglycerides 109 and LDL 97.      7. Onychomycosis  Overview:  Patient with chronic onychomycosis bilateral great toe nails.  Treatment options discussed with patient.    Start Lamisil 250 mg daily; potential side effects discussed with patient.  Potential for hepatotoxicity discussed with patient.    Patient has taken Lamisil for about 6 months.  Patient still has some thickening of the nails, coloration has improved significantly.  Patient advised to not take anymore Lamisil for the time being.  Patient advised nail thickness will take some time to resolve.      8. Psoriatic arthritis  Overview:  MARIBETH neg, dsDNA neg, RF Neg.   Stable on Leflunomide     05/29/2024:  Stable; followed by Dr. Jennings.   Patient is on leflunomide and sulfasalazine.  Patient has appointment with him next month.    Assessment & Plan:  Stable; see overview.      9. Obstructive sleep apnea syndrome  Overview:  Patient has been compliant with and doing well on CPAP therapy.  It would be medically prudent for patient to continue CPAP.    05/29/2024: Patient is compliant with and doing well on CPAP therapy.    Assessment & Plan:  Patient is compliant with and doing well on CPAP therapy.                 ..No follow-ups on file.       Future Appointments   Date Time Provider Department Center   5/29/2025  8:30 AM Hadley Guzmán MD [facility]

## 2025-01-27 ENCOUNTER — OFFICE VISIT (OUTPATIENT)
Dept: PRIMARY CARE CLINIC | Facility: CLINIC | Age: 55
End: 2025-01-27
Payer: COMMERCIAL

## 2025-01-27 VITALS
HEART RATE: 104 BPM | BODY MASS INDEX: 34.21 KG/M2 | OXYGEN SATURATION: 97 % | TEMPERATURE: 98 F | SYSTOLIC BLOOD PRESSURE: 138 MMHG | HEIGHT: 69 IN | DIASTOLIC BLOOD PRESSURE: 87 MMHG | WEIGHT: 231 LBS

## 2025-01-27 DIAGNOSIS — E66.811 CLASS 1 OBESITY DUE TO EXCESS CALORIES WITH SERIOUS COMORBIDITY AND BODY MASS INDEX (BMI) OF 34.0 TO 34.9 IN ADULT: ICD-10-CM

## 2025-01-27 DIAGNOSIS — L40.50 PSORIATIC ARTHRITIS: ICD-10-CM

## 2025-01-27 DIAGNOSIS — K21.9 GASTROESOPHAGEAL REFLUX DISEASE, UNSPECIFIED WHETHER ESOPHAGITIS PRESENT: ICD-10-CM

## 2025-01-27 DIAGNOSIS — Z79.899 DRUG-INDUCED IMMUNODEFICIENCY: ICD-10-CM

## 2025-01-27 DIAGNOSIS — L40.9 PSORIASIS: ICD-10-CM

## 2025-01-27 DIAGNOSIS — D84.821 DRUG-INDUCED IMMUNODEFICIENCY: ICD-10-CM

## 2025-01-27 DIAGNOSIS — N18.31 STAGE 3A CHRONIC KIDNEY DISEASE: Primary | ICD-10-CM

## 2025-01-27 DIAGNOSIS — E66.09 CLASS 1 OBESITY DUE TO EXCESS CALORIES WITH SERIOUS COMORBIDITY AND BODY MASS INDEX (BMI) OF 34.0 TO 34.9 IN ADULT: ICD-10-CM

## 2025-01-27 DIAGNOSIS — E78.00 HYPERCHOLESTEREMIA: ICD-10-CM

## 2025-01-27 DIAGNOSIS — G47.33 OBSTRUCTIVE SLEEP APNEA SYNDROME: ICD-10-CM

## 2025-01-27 DIAGNOSIS — B35.1 ONYCHOMYCOSIS: ICD-10-CM

## 2025-01-27 PROCEDURE — 99214 OFFICE O/P EST MOD 30 MIN: CPT | Mod: ,,, | Performed by: FAMILY MEDICINE

## 2025-01-27 PROCEDURE — 1159F MED LIST DOCD IN RCRD: CPT | Mod: CPTII,,, | Performed by: FAMILY MEDICINE

## 2025-01-27 PROCEDURE — 3075F SYST BP GE 130 - 139MM HG: CPT | Mod: CPTII,,, | Performed by: FAMILY MEDICINE

## 2025-01-27 PROCEDURE — 1160F RVW MEDS BY RX/DR IN RCRD: CPT | Mod: CPTII,,, | Performed by: FAMILY MEDICINE

## 2025-01-27 PROCEDURE — 3008F BODY MASS INDEX DOCD: CPT | Mod: CPTII,,, | Performed by: FAMILY MEDICINE

## 2025-01-27 PROCEDURE — 3079F DIAST BP 80-89 MM HG: CPT | Mod: CPTII,,, | Performed by: FAMILY MEDICINE

## 2025-01-27 RX ORDER — SULFASALAZINE 500 MG/1
1000 TABLET ORAL 2 TIMES DAILY
COMMUNITY
Start: 2024-12-30

## 2025-01-27 RX ORDER — TRIAMCINOLONE ACETONIDE 1 MG/G
OINTMENT TOPICAL 2 TIMES DAILY
COMMUNITY
Start: 2024-10-18

## 2025-01-27 NOTE — ASSESSMENT & PLAN NOTE
His last creatinine was 1.16 in May 2024.  Recheck values at upcoming wellness examination in May.    Patient advised to drink plenty of water.  Patient advised to avoid NSAIDs.

## 2025-01-27 NOTE — ASSESSMENT & PLAN NOTE
Stable; patient is on leflunomide and sulfasalazine for rheumatoid arthritis; followed by Dr. Jennings.

## 2025-01-27 NOTE — ASSESSMENT & PLAN NOTE
Patient has lost 9 lb since May 2024  Patient encouraged to make healthy food choices and limit portions.    Patient encouraged to limit intake of fried foods, processed foods and sugary foods.    Patient encouraged to lose weight.

## 2025-04-29 DIAGNOSIS — L40.9 PSORIASIS: ICD-10-CM

## 2025-04-29 RX ORDER — TERBINAFINE HYDROCHLORIDE 250 MG/1
250 TABLET ORAL
Qty: 30 TABLET | Refills: 2 | Status: SHIPPED | OUTPATIENT
Start: 2025-04-29

## 2025-05-20 DIAGNOSIS — N18.31 STAGE 3A CHRONIC KIDNEY DISEASE: ICD-10-CM

## 2025-05-20 DIAGNOSIS — E78.00 HYPERCHOLESTEREMIA: ICD-10-CM

## 2025-05-20 DIAGNOSIS — R53.83 FATIGUE, UNSPECIFIED TYPE: ICD-10-CM

## 2025-05-20 DIAGNOSIS — Z12.5 SCREENING PSA (PROSTATE SPECIFIC ANTIGEN): ICD-10-CM

## 2025-05-20 DIAGNOSIS — Z13.1 SCREENING FOR DIABETES MELLITUS: ICD-10-CM

## 2025-05-20 DIAGNOSIS — Z00.00 ENCOUNTER FOR WELLNESS EXAMINATION IN ADULT: Primary | ICD-10-CM

## 2025-07-07 ENCOUNTER — TELEPHONE (OUTPATIENT)
Dept: PRIMARY CARE CLINIC | Facility: CLINIC | Age: 55
End: 2025-07-07
Payer: COMMERCIAL

## 2025-07-07 DIAGNOSIS — E78.00 HYPERCHOLESTEREMIA: Primary | ICD-10-CM

## 2025-07-07 RX ORDER — ROSUVASTATIN CALCIUM 10 MG/1
10 TABLET, COATED ORAL DAILY
Qty: 30 TABLET | Refills: 11 | Status: SHIPPED | OUTPATIENT
Start: 2025-07-07 | End: 2026-07-02

## 2025-07-07 NOTE — TELEPHONE ENCOUNTER
Copied from CRM #[id number]. Topic: Medications - Medication Refill  >> Jul 7, 2025  1:18 PM Bharti wrote:  Who Called: Scott Plaza    Refill or New Rx:Refill  RX Name and Strength:rosuvastatin (CRESTOR) 10 MG tablet   How is the patient currently taking it? (ex. 1XDay):1 xday  Is this a 30 day or 90 day RX:30 tablet    Local or Mail Order:local       List of preferred pharmacies on file (remove unneeded): [unfilled]  If different Pharmacy is requested, enter Pharmacy information here including location and phone number: [address]   Phone: [phone]  Fax: [phone]     Ordering Provider:rachel Mary Method of Contact: Phone Call  Patient's Preferred Phone Number on File: [phone]   Best Call Back Number, if different:  Additional Information: refill request, pt called and stated pharmacy needs approval to refill, please advise. thanks

## 2025-08-15 ENCOUNTER — LAB VISIT (OUTPATIENT)
Dept: LAB | Facility: HOSPITAL | Age: 55
End: 2025-08-15
Attending: FAMILY MEDICINE
Payer: COMMERCIAL

## 2025-08-15 DIAGNOSIS — Z13.1 SCREENING FOR DIABETES MELLITUS: ICD-10-CM

## 2025-08-15 DIAGNOSIS — R53.83 FATIGUE, UNSPECIFIED TYPE: ICD-10-CM

## 2025-08-15 DIAGNOSIS — N18.31 STAGE 3A CHRONIC KIDNEY DISEASE: ICD-10-CM

## 2025-08-15 DIAGNOSIS — R79.89 INCREASED PTH LEVEL: ICD-10-CM

## 2025-08-15 DIAGNOSIS — Z12.5 SCREENING PSA (PROSTATE SPECIFIC ANTIGEN): ICD-10-CM

## 2025-08-15 DIAGNOSIS — E78.00 HYPERCHOLESTEREMIA: ICD-10-CM

## 2025-08-15 DIAGNOSIS — Z00.00 ENCOUNTER FOR WELLNESS EXAMINATION IN ADULT: ICD-10-CM

## 2025-08-15 LAB
ALBUMIN SERPL-MCNC: 4.1 G/DL (ref 3.5–5)
ALBUMIN/GLOB SERPL: 1.3 RATIO (ref 1.1–2)
ALP SERPL-CCNC: 68 UNIT/L (ref 40–150)
ALT SERPL-CCNC: 26 UNIT/L (ref 0–55)
ANION GAP SERPL CALC-SCNC: 12 MEQ/L
AST SERPL-CCNC: 17 UNIT/L (ref 11–45)
BASOPHILS # BLD AUTO: 0.02 X10(3)/MCL
BASOPHILS NFR BLD AUTO: 0.2 %
BILIRUB SERPL-MCNC: 0.3 MG/DL
BUN SERPL-MCNC: 12.4 MG/DL (ref 8.4–25.7)
CALCIUM SERPL-MCNC: 9.7 MG/DL (ref 8.4–10.2)
CHLORIDE SERPL-SCNC: 103 MMOL/L (ref 98–107)
CHOLEST SERPL-MCNC: 184 MG/DL
CHOLEST/HDLC SERPL: 3 {RATIO} (ref 0–5)
CO2 SERPL-SCNC: 23 MMOL/L (ref 22–29)
CREAT SERPL-MCNC: 1.29 MG/DL (ref 0.72–1.25)
CREAT UR-MCNC: 139.9 MG/DL (ref 63–166)
CREAT/UREA NIT SERPL: 10
EOSINOPHIL # BLD AUTO: 0.01 X10(3)/MCL (ref 0–0.9)
EOSINOPHIL NFR BLD AUTO: 0.1 %
ERYTHROCYTE [DISTWIDTH] IN BLOOD BY AUTOMATED COUNT: 14.6 % (ref 11.5–17)
EST. AVERAGE GLUCOSE BLD GHB EST-MCNC: 116.9 MG/DL
GFR SERPLBLD CREATININE-BSD FMLA CKD-EPI: >60 ML/MIN/1.73/M2
GLOBULIN SER-MCNC: 3.2 GM/DL (ref 2.4–3.5)
GLUCOSE SERPL-MCNC: 114 MG/DL (ref 74–100)
HBA1C MFR BLD: 5.7 %
HCT VFR BLD AUTO: 47.3 % (ref 42–52)
HDLC SERPL-MCNC: 62 MG/DL (ref 35–60)
HGB BLD-MCNC: 15.3 G/DL (ref 14–18)
IMM GRANULOCYTES # BLD AUTO: 0.05 X10(3)/MCL (ref 0–0.04)
IMM GRANULOCYTES NFR BLD AUTO: 0.5 %
LDLC SERPL CALC-MCNC: 109 MG/DL (ref 50–140)
LYMPHOCYTES # BLD AUTO: 1.64 X10(3)/MCL (ref 0.6–4.6)
LYMPHOCYTES NFR BLD AUTO: 15.1 %
MAGNESIUM SERPL-MCNC: 2 MG/DL (ref 1.6–2.6)
MCH RBC QN AUTO: 28 PG (ref 27–31)
MCHC RBC AUTO-ENTMCNC: 32.3 G/DL (ref 33–36)
MCV RBC AUTO: 86.6 FL (ref 80–94)
MONOCYTES # BLD AUTO: 0.56 X10(3)/MCL (ref 0.1–1.3)
MONOCYTES NFR BLD AUTO: 5.2 %
NEUTROPHILS # BLD AUTO: 8.55 X10(3)/MCL (ref 2.1–9.2)
NEUTROPHILS NFR BLD AUTO: 78.9 %
NRBC BLD AUTO-RTO: 0 %
PHOSPHATE SERPL-MCNC: 3.1 MG/DL (ref 2.3–4.7)
PLATELET # BLD AUTO: 288 X10(3)/MCL (ref 130–400)
PMV BLD AUTO: 9.1 FL (ref 7.4–10.4)
POTASSIUM SERPL-SCNC: 4.5 MMOL/L (ref 3.5–5.1)
PROT SERPL-MCNC: 7.3 GM/DL (ref 6.4–8.3)
PROT UR STRIP-MCNC: 10.1 MG/DL
PSA SERPL-MCNC: 1.7 NG/ML
PTH-INTACT SERPL-MCNC: 100.2 PG/ML (ref 8.7–77)
RBC # BLD AUTO: 5.46 X10(6)/MCL (ref 4.7–6.1)
SODIUM SERPL-SCNC: 138 MMOL/L (ref 136–145)
TRIGL SERPL-MCNC: 65 MG/DL (ref 34–140)
TSH SERPL-ACNC: 1.85 UIU/ML (ref 0.35–4.94)
URINE PROTEIN/CREATININE RATIO (OLG): 0.1
VLDLC SERPL CALC-MCNC: 13 MG/DL
WBC # BLD AUTO: 10.83 X10(3)/MCL (ref 4.5–11.5)

## 2025-08-15 PROCEDURE — 83735 ASSAY OF MAGNESIUM: CPT

## 2025-08-15 PROCEDURE — 84443 ASSAY THYROID STIM HORMONE: CPT

## 2025-08-15 PROCEDURE — 85025 COMPLETE CBC W/AUTO DIFF WBC: CPT

## 2025-08-15 PROCEDURE — 80053 COMPREHEN METABOLIC PANEL: CPT

## 2025-08-15 PROCEDURE — 36415 COLL VENOUS BLD VENIPUNCTURE: CPT

## 2025-08-15 PROCEDURE — 83036 HEMOGLOBIN GLYCOSYLATED A1C: CPT

## 2025-08-15 PROCEDURE — 84100 ASSAY OF PHOSPHORUS: CPT

## 2025-08-15 PROCEDURE — 84153 ASSAY OF PSA TOTAL: CPT

## 2025-08-15 PROCEDURE — 80061 LIPID PANEL: CPT

## 2025-08-15 PROCEDURE — 83970 ASSAY OF PARATHORMONE: CPT

## 2025-08-15 PROCEDURE — 84156 ASSAY OF PROTEIN URINE: CPT

## 2025-08-26 ENCOUNTER — OFFICE VISIT (OUTPATIENT)
Dept: PRIMARY CARE CLINIC | Facility: CLINIC | Age: 55
End: 2025-08-26
Payer: COMMERCIAL

## 2025-08-26 VITALS
WEIGHT: 231 LBS | OXYGEN SATURATION: 96 % | HEIGHT: 69 IN | TEMPERATURE: 98 F | DIASTOLIC BLOOD PRESSURE: 82 MMHG | BODY MASS INDEX: 34.21 KG/M2 | HEART RATE: 105 BPM | SYSTOLIC BLOOD PRESSURE: 131 MMHG

## 2025-08-26 DIAGNOSIS — Z79.899 DRUG-INDUCED IMMUNODEFICIENCY: ICD-10-CM

## 2025-08-26 DIAGNOSIS — Z12.5 SCREENING PSA (PROSTATE SPECIFIC ANTIGEN): ICD-10-CM

## 2025-08-26 DIAGNOSIS — N18.31 STAGE 3A CHRONIC KIDNEY DISEASE: ICD-10-CM

## 2025-08-26 DIAGNOSIS — E66.811 CLASS 1 OBESITY DUE TO EXCESS CALORIES WITH SERIOUS COMORBIDITY AND BODY MASS INDEX (BMI) OF 34.0 TO 34.9 IN ADULT: ICD-10-CM

## 2025-08-26 DIAGNOSIS — E78.00 HYPERCHOLESTEREMIA: ICD-10-CM

## 2025-08-26 DIAGNOSIS — Z00.00 ENCOUNTER FOR WELLNESS EXAMINATION IN ADULT: Primary | ICD-10-CM

## 2025-08-26 DIAGNOSIS — K21.9 GASTROESOPHAGEAL REFLUX DISEASE, UNSPECIFIED WHETHER ESOPHAGITIS PRESENT: ICD-10-CM

## 2025-08-26 DIAGNOSIS — L40.50 PSORIATIC ARTHRITIS: ICD-10-CM

## 2025-08-26 DIAGNOSIS — L40.9 PSORIASIS: ICD-10-CM

## 2025-08-26 DIAGNOSIS — R73.03 PREDIABETES: ICD-10-CM

## 2025-08-26 DIAGNOSIS — D84.821 DRUG-INDUCED IMMUNODEFICIENCY: ICD-10-CM

## 2025-08-26 DIAGNOSIS — E66.09 CLASS 1 OBESITY DUE TO EXCESS CALORIES WITH SERIOUS COMORBIDITY AND BODY MASS INDEX (BMI) OF 34.0 TO 34.9 IN ADULT: ICD-10-CM

## 2025-08-26 DIAGNOSIS — G47.33 OBSTRUCTIVE SLEEP APNEA SYNDROME: ICD-10-CM

## 2025-08-26 PROCEDURE — 3075F SYST BP GE 130 - 139MM HG: CPT | Mod: CPTII,,, | Performed by: FAMILY MEDICINE

## 2025-08-26 PROCEDURE — 3044F HG A1C LEVEL LT 7.0%: CPT | Mod: CPTII,,, | Performed by: FAMILY MEDICINE

## 2025-08-26 PROCEDURE — 1159F MED LIST DOCD IN RCRD: CPT | Mod: CPTII,,, | Performed by: FAMILY MEDICINE

## 2025-08-26 PROCEDURE — 3008F BODY MASS INDEX DOCD: CPT | Mod: CPTII,,, | Performed by: FAMILY MEDICINE

## 2025-08-26 PROCEDURE — 99396 PREV VISIT EST AGE 40-64: CPT | Mod: ,,, | Performed by: FAMILY MEDICINE

## 2025-08-26 PROCEDURE — 3079F DIAST BP 80-89 MM HG: CPT | Mod: CPTII,,, | Performed by: FAMILY MEDICINE

## 2025-08-26 PROCEDURE — 1160F RVW MEDS BY RX/DR IN RCRD: CPT | Mod: CPTII,,, | Performed by: FAMILY MEDICINE

## 2025-08-26 RX ORDER — PREDNISONE 10 MG/1
TABLET ORAL
COMMUNITY
Start: 2025-08-14

## 2025-08-26 RX ORDER — APREMILAST 30 MG/1
1 TABLET, FILM COATED ORAL 2 TIMES DAILY
COMMUNITY
Start: 2025-08-08